# Patient Record
Sex: FEMALE | Race: WHITE | NOT HISPANIC OR LATINO | URBAN - METROPOLITAN AREA
[De-identification: names, ages, dates, MRNs, and addresses within clinical notes are randomized per-mention and may not be internally consistent; named-entity substitution may affect disease eponyms.]

---

## 2022-12-27 ENCOUNTER — OFFICE VISIT (OUTPATIENT)
Dept: URGENT CARE | Facility: CLINIC | Age: 32
End: 2022-12-27

## 2022-12-27 VITALS — TEMPERATURE: 97.9 F | RESPIRATION RATE: 20 BRPM | HEART RATE: 114 BPM | OXYGEN SATURATION: 97 %

## 2022-12-27 DIAGNOSIS — J02.0 STREP PHARYNGITIS: Primary | ICD-10-CM

## 2022-12-27 LAB — S PYO AG THROAT QL: NEGATIVE

## 2022-12-27 RX ORDER — AMOXICILLIN 500 MG/1
500 CAPSULE ORAL EVERY 12 HOURS SCHEDULED
Qty: 20 CAPSULE | Refills: 0 | Status: SHIPPED | OUTPATIENT
Start: 2022-12-27 | End: 2023-01-06

## 2022-12-27 RX ORDER — AMOXICILLIN 500 MG/1
500 CAPSULE ORAL EVERY 12 HOURS SCHEDULED
Qty: 20 CAPSULE | Refills: 0 | Status: SHIPPED | OUTPATIENT
Start: 2022-12-27 | End: 2022-12-27 | Stop reason: CLARIF

## 2022-12-28 NOTE — PATIENT INSTRUCTIONS
Take antibiotics as prescribed  Make sure to take all the antibiotic even after you start feeling better  If you stop them too soon, you risk developing a resistant infection that is more difficult and expensive to treat  Never save antibiotics or take antibiotics without the recommendation of a healthcare provider or dental professional  Note that if you are taking birth control medications, antibiotics can decrease their effectiveness  Recommend abstinence or back up method while on antibiotics and for 3-5 days after completing the antibiotic course  You are considered contagious until you have been on the antibiotic for 24 hours  You should not share food or drinks with others and should not kiss on the mouth in that time  You should also stay home from work or school to avoid spreading the infection to others  You need to switch to a brand new, never used toothbrush after 48 hours (or 2 days on the antibiotic) to prevent giving strep back to yourself again  If symptoms are not improved after 2-3 days on the antibiotics, follow-up with your primary care provider  If symptoms worsen or new symptoms such as drooling, difficulty swallowing, voice changes, anterior neck pain, or jaw pain develop report to the emergency room as these are symptoms of an abscess having formed in your throat or neck

## 2022-12-28 NOTE — PROGRESS NOTES
[facility]        NAME: Firman Cheadle is a 29 y o  female  : 1990    MRN: [id number]  DATE: 2022  TIME: 7:54 PM    Assessment and Plan   Strep pharyngitis [J02 0]  1  Strep pharyngitis  POCT rapid strepA    amoxicillin (AMOXIL) 500 mg capsule      Patient presents with symptoms concerning for possible strep  Rapid strep in clinic is negative; however, patient meets criteria for empiric treatment with exudative pharyngitis, lymphadenopathy, fever, absence of cough  She will be started on amoxicillin to treat as a treatment of choice  Patient instructed to follow-up with her primary care doctor in 2 to 3 days symptoms do not improve  We discussed symptoms of peritonsillar, tonsillar, and retropharyngeal abscesses and when to report to the emergency room  Patient Instructions     Patient Instructions   • Take antibiotics as prescribed  Make sure to take all the antibiotic even after you start feeling better  If you stop them too soon, you risk developing a resistant infection that is more difficult and expensive to treat  Never save antibiotics or take antibiotics without the recommendation of a healthcare provider or dental professional  Note that if you are taking birth control medications, antibiotics can decrease their effectiveness  Recommend abstinence or back up method while on antibiotics and for 3-5 days after completing the antibiotic course  • You are considered contagious until you have been on the antibiotic for 24 hours  You should not share food or drinks with others and should not kiss on the mouth in that time  You should also stay home from work or school to avoid spreading the infection to others  • You need to switch to a brand new, never used toothbrush after 48 hours (or 2 days on the antibiotic) to prevent giving strep back to yourself again  • If symptoms are not improved after 2-3 days on the antibiotics, follow-up with your primary care provider  • If symptoms worsen or new symptoms such as drooling, difficulty swallowing, voice changes, anterior neck pain, or jaw pain develop report to the emergency room as these are symptoms of an abscess having formed in your throat or neck  Follow up with PCP in 3-5 days  Proceed to  ER if symptoms worsen  Chief Complaint     Chief Complaint   Patient presents with   • Sore Throat     Started 4 days ago  Chills, fever nausea, body aches  History of Present Illness       29year old female presents with complaint of sore throat x 4 days  Pt notes fever of 100 7 and reports that she has been nauseated today  Pt reports having strep in the past and reports symptoms are similar to when she has had it before  Sore Throat   This is a new problem  The current episode started in the past 7 days  The problem has been gradually worsening  The pain is worse on the left side  The maximum temperature recorded prior to her arrival was 100 4 - 100 9 F  The fever has been present for 1 to 2 days  The pain is at a severity of 8/10  The pain is moderate  Associated symptoms include swollen glands  Pertinent negatives include no congestion, coughing, diarrhea, headaches, shortness of breath, trouble swallowing (painful to swallow) or vomiting  She has had no exposure to strep or mono  She has tried cool liquids, gargles and acetaminophen for the symptoms  The treatment provided no relief  Review of Systems   Review of Systems   Constitutional: Negative for chills, fatigue and fever  HENT: Positive for sore throat  Negative for congestion, postnasal drip, rhinorrhea, trouble swallowing (painful to swallow) and voice change  Respiratory: Negative for cough and shortness of breath  Cardiovascular: Negative for chest pain  Gastrointestinal: Negative for diarrhea, nausea and vomiting  Musculoskeletal: Negative for myalgias  Neurological: Negative for headaches           Current Medications Current Outpatient Medications:   •  amoxicillin (AMOXIL) 500 mg capsule, Take 1 capsule (500 mg total) by mouth every 12 (twelve) hours for 10 days, Disp: 20 capsule, Rfl: 0    Current Allergies     Allergies as of 12/27/2022   • (No Known Allergies)            The following portions of the patient's history were reviewed and updated as appropriate: allergies, current medications, past family history, past medical history, past social history, past surgical history and problem list      No past medical history on file  No past surgical history on file  No family history on file  Medications have been verified  Objective   Pulse (!) 114   Temp 97 9 °F (36 6 °C) (Temporal)   Resp 20   SpO2 97%   No LMP recorded  Physical Exam     Physical Exam  Vitals and nursing note reviewed  Constitutional:       General: She is not in acute distress  Appearance: Normal appearance  She is not ill-appearing or toxic-appearing  HENT:      Head: Normocephalic and atraumatic  Jaw: No trismus  Right Ear: Hearing, tympanic membrane, ear canal and external ear normal  There is no impacted cerumen  No foreign body  Left Ear: Hearing, tympanic membrane, ear canal and external ear normal  There is no impacted cerumen  No foreign body  Nose: No nasal deformity, mucosal edema, congestion or rhinorrhea  Right Nostril: No foreign body, epistaxis or occlusion  Left Nostril: No foreign body, epistaxis or occlusion  Right Turbinates: Not enlarged, swollen or pale  Left Turbinates: Not enlarged, swollen or pale  Mouth/Throat:      Lips: Pink  No lesions  Mouth: Mucous membranes are moist  No injury, oral lesions or angioedema  Dentition: Normal dentition  Tongue: No lesions  Tongue does not deviate from midline  Palate: No mass and lesions  Pharynx: Uvula midline  Pharyngeal swelling and posterior oropharyngeal erythema present   No oropharyngeal exudate or uvula swelling  Tonsils: Tonsillar exudate present  No tonsillar abscesses  2+ on the right  2+ on the left  Eyes:      General: Lids are normal  Gaze aligned appropriately  No allergic shiner  Extraocular Movements: Extraocular movements intact  Cardiovascular:      Rate and Rhythm: Normal rate  Pulmonary:      Effort: Pulmonary effort is normal       Comments: Patient speaking in full sentences with no increased respiratory effort  No audible wheezing or stridor  Lymphadenopathy:      Cervical: Cervical adenopathy present  Right cervical: Superficial cervical adenopathy present  No deep or posterior cervical adenopathy  Left cervical: Superficial cervical adenopathy present  No deep or posterior cervical adenopathy  Skin:     General: Skin is warm and dry  Neurological:      Mental Status: She is alert and oriented to person, place, and time  Coordination: Coordination is intact  Gait: Gait is intact  Psychiatric:         Attention and Perception: Attention and perception normal          Mood and Affect: Mood and affect normal          Speech: Speech normal          Behavior: Behavior is cooperative  Note: Portions of this record may have been created with voice recognition software  Occasional wrong word or "sound a like" substitutions may have occurred due to the inherent limitations of voice recognition software  Please read the chart carefully and recognize, using context, where substitutions have occurred  *

## 2023-05-02 ENCOUNTER — OFFICE VISIT (OUTPATIENT)
Dept: FAMILY MEDICINE CLINIC | Facility: CLINIC | Age: 33
End: 2023-05-02

## 2023-05-02 VITALS
WEIGHT: 242 LBS | OXYGEN SATURATION: 98 % | HEART RATE: 92 BPM | BODY MASS INDEX: 40.32 KG/M2 | DIASTOLIC BLOOD PRESSURE: 82 MMHG | TEMPERATURE: 97.1 F | HEIGHT: 65 IN | SYSTOLIC BLOOD PRESSURE: 112 MMHG | RESPIRATION RATE: 16 BRPM

## 2023-05-02 DIAGNOSIS — Z11.4 SCREENING FOR HIV (HUMAN IMMUNODEFICIENCY VIRUS): ICD-10-CM

## 2023-05-02 DIAGNOSIS — M85.00 FIBROUS DYSPLASIA OF BONE: ICD-10-CM

## 2023-05-02 DIAGNOSIS — R63.5 WEIGHT GAIN: ICD-10-CM

## 2023-05-02 DIAGNOSIS — Z13.1 SCREENING FOR DIABETES MELLITUS (DM): ICD-10-CM

## 2023-05-02 DIAGNOSIS — Z11.59 NEED FOR HEPATITIS C SCREENING TEST: ICD-10-CM

## 2023-05-02 DIAGNOSIS — E66.01 MORBID OBESITY WITH BMI OF 40.0-44.9, ADULT (HCC): Primary | ICD-10-CM

## 2023-05-02 DIAGNOSIS — Z76.89 ENCOUNTER TO ESTABLISH CARE: ICD-10-CM

## 2023-05-02 DIAGNOSIS — Z13.220 SCREENING FOR LIPID DISORDERS: ICD-10-CM

## 2023-05-02 NOTE — PATIENT INSTRUCTIONS
Stress management  Routine labwork and screenings as ordered  Weight loss is recommended to improve overall health  Dietary changes- limit carbohydrates, decrease overall caloric intake, reduce portion sizes, healthier snack choices, limit saturated fats, increase intake of fruits and vegetables, limit junk food  exercise to 3-5 times per week  Consider adding strength exercises to exercise routine

## 2023-05-02 NOTE — PROGRESS NOTES
Name: Hallie Sharif      : 1990      MRN: [id number]  Encounter Provider: HILLARY Jacques  Encounter Date: 2023   Encounter department: [address] [place] [address]  Morbid obesity with BMI of 40 0-44 9, adult ([address] )  Weight loss is recommended to improve overall health  Dietary changes- limit carbohydrates, decrease overall caloric intake, reduce portion sizes, healthier snack choices, limit saturated fats, increase intake of fruits and vegetables, limit junk food  exercise to 3-5 times per week  Consider adding strength exercises to exercise routine    - CBC and differential  - Comprehensive metabolic panel  - Hemoglobin A1C  - Lipid panel  - TSH, 3rd generation    2  Encounter to establish care  Heart healthy, carbohydrate controlled diet- limit red meat, limit saturated fat, moderate salt intake, limit junk food, etc    Regular exercise  Stress management  Routine labwork and screenings as ordered  - CBC and differential  - Comprehensive metabolic panel  - Hemoglobin A1C  - Lipid panel  - Hepatitis C antibody  - HIV 1/2 AG/AB W [facility]    3  Screening for diabetes mellitus (DM)  - Comprehensive metabolic panel  - Hemoglobin A1C    4  Screening for lipid disorders  - Lipid panel    5  Screening for HIV (human immunodeficiency virus)  - HIV 1/2 AG/AB W [facility] only    6  Need for hepatitis C screening test  - Hepatitis C antibody    7  Fibrous dysplasia of bone  - Comprehensive metabolic panel  - Vitamin D 25 hydroxy    8  Weight gain  - TSH, 3rd generation      BMI Counseling: Body mass index is 40 27 kg/m²  The BMI is above normal  Nutrition recommendations include reducing portion sizes and decreasing overall calorie intake  Exercise recommendations include exercising 3-5 times per week  Depression Screening Follow-up Plan: Patient's depression screening was negative with a PHQ-2 score of 0    Clinically patient does "not have depression  No treatment is required  Subjective      Pt here to establish care  Accompanied by   PMH, meds, allergies, SH, FH reviewed  History: fibrous dysplasia diagnosed 2010 left upper and lower arm  Surgeries: none  FH: mother- DM , lupus, sarcoidosis  Father- heart , htn  Maternal GM- breast cancer  Believes pancreatic and brain cancer on maternal side  SH: , lives with   Does not work outside of home  Non smoker  No etoh  No recreational drugs  Current medications: none  Current issues include: none  Has been gaining weight over last year or so  Thought was due to birth control , but is no longer taking  Review of Systems   Constitutional: Positive for unexpected weight change (gaining over past year)  HENT: Negative for congestion, sinus pressure, sinus pain and sore throat  Eyes: Negative for visual disturbance  Respiratory: Negative for cough, chest tightness and shortness of breath  Cardiovascular: Negative for chest pain, palpitations and leg swelling  Gastrointestinal: Negative for abdominal pain, diarrhea, nausea and vomiting  Endocrine: Negative for polydipsia and polyuria  Genitourinary: Negative for dysuria  Musculoskeletal: Positive for back pain (chronic, old injury 4 years)  Skin: Negative for rash and wound  Allergic/Immunologic: Negative for environmental allergies  Neurological: Negative for dizziness and headaches  Hematological: Does not bruise/bleed easily  Psychiatric/Behavioral: Negative for dysphoric mood  The patient is nervous/anxious (at times, but not significant)  No current outpatient medications on file prior to visit  Objective     /82   Pulse 92   Temp (!) 97 1 °F (36 2 °C) (Temporal)   Resp 16   Ht 5' 5\" (1 651 m)   Wt 110 kg (242 lb)   LMP 04/03/2023 (Approximate)   SpO2 98%   BMI 40 27 kg/m²     Physical Exam  Vitals reviewed     Constitutional:       General: She is not in " acute distress  Appearance: She is obese  She is not ill-appearing  Neck:      Vascular: No carotid bruit  Cardiovascular:      Rate and Rhythm: Normal rate and regular rhythm  Pulmonary:      Effort: Pulmonary effort is normal  No respiratory distress  Breath sounds: Normal breath sounds  No wheezing or rales  Abdominal:      General: There is no distension  Palpations: Abdomen is soft  Musculoskeletal:      Cervical back: Normal range of motion  Right lower leg: No edema  Left lower leg: No edema  Skin:     General: Skin is warm and dry  Coloration: Skin is not jaundiced or pale  Neurological:      General: No focal deficit present  Mental Status: She is alert and oriented to person, place, and time  Cranial Nerves: No cranial nerve deficit  Sensory: No sensory deficit  Psychiatric:         Mood and Affect: Mood normal          Behavior: Behavior normal          Thought Content:  Thought content normal          Judgment: Judgment normal        Richard Osorio

## 2023-05-03 LAB
25(OH)D3+25(OH)D2 SERPL-MCNC: 26.7 NG/ML (ref 30–100)
ALBUMIN SERPL-MCNC: 4.6 G/DL (ref 3.9–5)
ALBUMIN/GLOB SERPL: 1.7 {RATIO} (ref 1.2–2.2)
ALP SERPL-CCNC: 108 IU/L (ref 44–121)
ALT SERPL-CCNC: 23 IU/L (ref 0–32)
AST SERPL-CCNC: 21 IU/L (ref 0–40)
BASOPHILS # BLD AUTO: 0 X10E3/UL (ref 0–0.2)
BASOPHILS NFR BLD AUTO: 1 %
BILIRUB SERPL-MCNC: 0.2 MG/DL (ref 0–1.2)
BUN SERPL-MCNC: 13 MG/DL (ref 6–20)
BUN/CREAT SERPL: 15 (ref 9–23)
CALCIUM SERPL-MCNC: 9.7 MG/DL (ref 8.7–10.2)
CHLORIDE SERPL-SCNC: 104 MMOL/L (ref 96–106)
CHOLEST SERPL-MCNC: 154 MG/DL (ref 100–199)
CHOLEST/HDLC SERPL: 3.7 RATIO (ref 0–4.4)
CO2 SERPL-SCNC: 20 MMOL/L (ref 20–29)
CREAT SERPL-MCNC: 0.87 MG/DL (ref 0.57–1)
EGFR: 92 ML/MIN/1.73
EOSINOPHIL # BLD AUTO: 0.1 X10E3/UL (ref 0–0.4)
EOSINOPHIL NFR BLD AUTO: 2 %
ERYTHROCYTE [DISTWIDTH] IN BLOOD BY AUTOMATED COUNT: 12.8 % (ref 11.7–15.4)
EST. AVERAGE GLUCOSE BLD GHB EST-MCNC: 117 MG/DL
GLOBULIN SER-MCNC: 2.7 G/DL (ref 1.5–4.5)
GLUCOSE SERPL-MCNC: 94 MG/DL (ref 70–99)
HBA1C MFR BLD: 5.7 % (ref 4.8–5.6)
HCT VFR BLD AUTO: 41.1 % (ref 34–46.6)
HCV AB S/CO SERPL IA: NON REACTIVE
HDLC SERPL-MCNC: 42 MG/DL
HGB BLD-MCNC: 14 G/DL (ref 11.1–15.9)
HIV 1+2 AB+HIV1 P24 AG SERPL QL IA: NON REACTIVE
IMM GRANULOCYTES # BLD: 0 X10E3/UL (ref 0–0.1)
IMM GRANULOCYTES NFR BLD: 0 %
LDLC SERPL CALC-MCNC: 83 MG/DL (ref 0–99)
LYMPHOCYTES # BLD AUTO: 1.6 X10E3/UL (ref 0.7–3.1)
LYMPHOCYTES NFR BLD AUTO: 24 %
MCH RBC QN AUTO: 29.5 PG (ref 26.6–33)
MCHC RBC AUTO-ENTMCNC: 34.1 G/DL (ref 31.5–35.7)
MCV RBC AUTO: 87 FL (ref 79–97)
MONOCYTES # BLD AUTO: 0.6 X10E3/UL (ref 0.1–0.9)
MONOCYTES NFR BLD AUTO: 9 %
NEUTROPHILS # BLD AUTO: 4.4 X10E3/UL (ref 1.4–7)
NEUTROPHILS NFR BLD AUTO: 64 %
PLATELET # BLD AUTO: 298 X10E3/UL (ref 150–450)
POTASSIUM SERPL-SCNC: 4.2 MMOL/L (ref 3.5–5.2)
PROT SERPL-MCNC: 7.3 G/DL (ref 6–8.5)
RBC # BLD AUTO: 4.74 X10E6/UL (ref 3.77–5.28)
SL AMB VLDL CHOLESTEROL CALC: 29 MG/DL (ref 5–40)
SODIUM SERPL-SCNC: 140 MMOL/L (ref 134–144)
TRIGL SERPL-MCNC: 168 MG/DL (ref 0–149)
TSH SERPL DL<=0.005 MIU/L-ACNC: 2.52 UIU/ML (ref 0.45–4.5)
WBC # BLD AUTO: 6.7 X10E3/UL (ref 3.4–10.8)

## 2023-05-08 ENCOUNTER — OFFICE VISIT (OUTPATIENT)
Dept: FAMILY MEDICINE CLINIC | Facility: CLINIC | Age: 33
End: 2023-05-08

## 2023-05-08 VITALS
RESPIRATION RATE: 16 BRPM | BODY MASS INDEX: 41.15 KG/M2 | HEIGHT: 65 IN | SYSTOLIC BLOOD PRESSURE: 116 MMHG | WEIGHT: 247 LBS | OXYGEN SATURATION: 98 % | HEART RATE: 74 BPM | DIASTOLIC BLOOD PRESSURE: 80 MMHG | TEMPERATURE: 97.1 F

## 2023-05-08 DIAGNOSIS — Z00.00 ANNUAL PHYSICAL EXAM: Primary | ICD-10-CM

## 2023-05-08 DIAGNOSIS — Z91.018 MULTIPLE FOOD ALLERGIES: ICD-10-CM

## 2023-05-08 PROBLEM — R73.03 PREDIABETES: Status: ACTIVE | Noted: 2023-05-08

## 2023-05-08 PROBLEM — E55.9 VITAMIN D DEFICIENCY: Status: ACTIVE | Noted: 2023-05-08

## 2023-05-08 NOTE — PATIENT INSTRUCTIONS
Heart healthy, carbohydrate controlled diet- limit red meat, limit saturated fat, moderate salt intake, limit junk food, etc    Regular exercise  Stress management  Routine labwork and screenings as ordered     Over the counter vitamin D3 1000 units daily

## 2023-05-08 NOTE — PROGRESS NOTES
FAMILY PRACTICE HEALTH MAINTENANCE OFFICE VISIT  [facility]    NAME: Sarah Melvin  AGE: 29 y o  SEX: female  : 1990     DATE: 2023    Assessment and Plan   1  Annual physical exam  Heart healthy, carbohydrate controlled diet- limit red meat, limit saturated fat, moderate salt intake, limit junk food, etc    Regular exercise  Stress management  Routine labwork and screenings as ordered  otc vitamin D3, 1000 units daily    2  Multiple food allergies  - Ambulatory Referral to Allergy; Future      · Patient Counseling:   · Nutrition: Stressed importance of a well balanced diet, moderation of sodium/saturated fat, caloric balance and sufficient intake of fiber  · Exercise: Stressed the importance of regular exercise with a goal of 150 minutes per week  · Dental Health: Discussed daily flossing and brushing and regular dental visits   · Alcohol Use:  Recommended moderation of alcohol intake  · Injury Prevention: Discussed Safety Belts, Safety Helmets, and Smoke Detectors    · Immunizations reviewed: Risks and Benefits discussed  · Discussed benefits of:  Cervical Cancer screening and Screening labs  • BMI Counseling: Body mass index is 41 1 kg/m²  Discussed with patient's BMI with her  The BMI is above normal  Nutrition recommendations include reducing portion sizes, decreasing overall calorie intake, moderation in carbohydrate intake, reducing intake of saturated fat and trans fat and reducing intake of cholesterol  Exercise recommendations include exercising 3-5 times per week            Chief Complaint     Chief Complaint   Patient presents with   • Physical Exam     Lab review       History of Present Illness     Here for annual exam and lab review  Feels well  No recent illness  No specific concerns or issues except would like to see allergist  Ephraim Bell that she has undiagnosed food allergies and would like to have testing and eval  Certain foods cause GI upset  Thinks has egg allergy but never tested      Well Adult Physical   Patient here for a comprehensive physical exam       Diet and Physical Activity  Diet: well balanced diet  Exercise: rarely      Depression Screen  PHQ-2/9 Depression Screening    Little interest or pleasure in doing things: 0 - not at all  Feeling down, depressed, or hopeless: 0 - not at all  PHQ-2 Score: 0  PHQ-2 Interpretation: Negative depression screen     Depression Screening Follow-up Plan: Patient's depression screening was negative with a PHQ-2 score of 0    Clinically patient does not have depression  No treatment is required  General Health  Hearing: Normal:  bilateral  Vision: goes for regular eye exams  Dental: regular dental visits    Reproductive Health  Follows with gynecologist      The following portions of the patient's history were reviewed and updated as appropriate: allergies, current medications, past family history, past medical history, past social history, past surgical history and problem list     Review of Systems     Review of Systems   Constitutional: Negative for chills, diaphoresis, fatigue and fever  HENT: Negative for congestion, ear pain, sinus pressure, sinus pain and sore throat  Eyes: Negative for visual disturbance  Respiratory: Negative for cough, chest tightness, shortness of breath and wheezing  Cardiovascular: Negative for chest pain, palpitations and leg swelling  Gastrointestinal: Negative for abdominal distention, abdominal pain, diarrhea and nausea  Endocrine: Negative for polydipsia  Genitourinary: Negative for dysuria, frequency and urgency  Musculoskeletal: Negative for arthralgias, back pain and myalgias  Allergic/Immunologic: Negative for immunocompromised state  Neurological: Negative for dizziness, weakness and headaches  Hematological: Negative for adenopathy  Psychiatric/Behavioral: Negative for dysphoric mood  The patient is not nervous/anxious  All other systems reviewed and are negative  Past Medical History     History reviewed  No pertinent past medical history  Past Surgical History     History reviewed  No pertinent surgical history  Social History     Social History     Socioeconomic History   • Marital status: Unknown     Spouse name: None   • Number of children: None   • Years of education: None   • Highest education level: None   Occupational History   • None   Tobacco Use   • Smoking status: Never     Passive exposure: Never   • Smokeless tobacco: Never   Vaping Use   • Vaping Use: Never used   Substance and Sexual Activity   • Alcohol use: Never   • Drug use: Never   • Sexual activity: None   Other Topics Concern   • None   Social History Narrative   • None     Social Determinants of Health     Financial Resource Strain: Not on file   Food Insecurity: Not on file   Transportation Needs: Not on file   Physical Activity: Not on file   Stress: Not on file   Social Connections: Not on file   Intimate Partner Violence: Not on file   Housing Stability: Not on file       Family History     Family History   Problem Relation Age of Onset   • Lupus Mother    • Diabetes Mother    • Hypertension Father        Current Medications     No current outpatient medications on file       Allergies     Allergies   Allergen Reactions   • Nickel Itching       Objective     Recent Results (from the past 672 hour(s))   CBC and differential    Collection Time: 05/02/23  1:41 PM   Result Value Ref Range    White Blood Cell Count 6 7 3 4 - 10 8 x10E3/uL    Red Blood Cell Count 4 74 3 77 - 5 28 x10E6/uL    Hemoglobin 14 0 11 1 - 15 9 g/dL    HCT 41 1 34 0 - 46 6 %    MCV 87 79 - 97 fL    MCH 29 5 26 6 - 33 0 pg    MCHC 34 1 31 5 - 35 7 g/dL    RDW 12 8 11 7 - 15 4 %    Platelet Count 177 405 - 450 x10E3/uL    Neutrophils 64 Not Estab  %    Lymphocytes 24 Not Estab  %    Monocytes 9 Not Estab  %    Eosinophils 2 Not Estab  %    Basophils PCT 1 Not Estab  % Neutrophils (Absolute) 4 4 1 4 - 7 0 x10E3/uL    Lymphocytes (Absolute) 1 6 0 7 - 3 1 x10E3/uL    Monocytes (Absolute) 0 6 0 1 - 0 9 x10E3/uL    Eosinophils (Absolute) 0 1 0 0 - 0 4 x10E3/uL    Basophils ABS 0 0 0 0 - 0 2 x10E3/uL    Immature Granulocytes 0 Not Estab  %    Immature Granulocytes (Absolute) 0 0 0 0 - 0 1 x10E3/uL   Comprehensive metabolic panel    Collection Time: 05/02/23  1:41 PM   Result Value Ref Range    Glucose, Random 94 70 - 99 mg/dL    BUN 13 6 - 20 mg/dL    Creatinine 0 87 0 57 - 1 00 mg/dL    eGFR 92 >59 mL/min/1 73    SL AMB BUN/CREATININE RATIO 15 9 - 23    Sodium 140 134 - 144 mmol/L    Potassium 4 2 3 5 - 5 2 mmol/L    Chloride 104 96 - 106 mmol/L    CO2 20 20 - 29 mmol/L    CALCIUM 9 7 8 7 - 10 2 mg/dL    Protein, Total 7 3 6 0 - 8 5 g/dL    Albumin 4 6 3 9 - 5 0 g/dL    Globulin, Total 2 7 1 5 - 4 5 g/dL    Albumin/Globulin Ratio 1 7 1 2 - 2 2    TOTAL BILIRUBIN 0 2 0 0 - 1 2 mg/dL    Alk Phos Isoenzymes 108 44 - 121 IU/L    AST 21 0 - 40 IU/L    ALT 23 0 - 32 IU/L   Hemoglobin A1C    Collection Time: 05/02/23  1:41 PM   Result Value Ref Range    Hemoglobin A1C 5 7 (H) 4 8 - 5 6 %    Estimated Average Glucose 117 mg/dL   Lipid panel    Collection Time: 05/02/23  1:41 PM   Result Value Ref Range    Cholesterol, Total 154 100 - 199 mg/dL    Triglycerides 168 (H) 0 - 149 mg/dL    HDL 42 >39 mg/dL    VLDL Cholesterol Calculated 29 5 - 40 mg/dL    LDL Calculated 83 0 - 99 mg/dL    T   Chol/HDL Ratio 3 7 0 0 - 4 4 ratio   TSH, 3rd generation    Collection Time: 05/02/23  1:41 PM   Result Value Ref Range    TSH 2 520 0 450 - 4 500 uIU/mL   Vitamin D 25 hydroxy    Collection Time: 05/02/23  1:41 PM   Result Value Ref Range    25-HYDROXY VIT D 26 7 (L) 30 0 - 100 0 ng/mL   Hepatitis C antibody    Collection Time: 05/02/23  1:41 PM   Result Value Ref Range    HEP C AB Non Reactive Non Reactive   HIV 1/2 AG/AB W [facility]LEX [facility] only    Collection Time: 05/02/23  1:41 PM   Result Value "Ref Range    HIV Screen 4th Generation wRflx Non Reactive Non Reactive       /80   Pulse 74   Temp (!) 97 1 °F (36 2 °C) (Temporal)   Resp 16   Ht 5' 5\" (1 651 m)   Wt 112 kg (247 lb)   SpO2 98%   BMI 41 10 kg/m²      Physical Exam  Constitutional:       General: She is not in acute distress  Appearance: She is well-developed  She is obese  She is not ill-appearing  HENT:      Head: Normocephalic and atraumatic  Right Ear: Tympanic membrane normal       Left Ear: Tympanic membrane and ear canal normal       Nose: Nose normal       Mouth/Throat:      Mouth: Mucous membranes are moist       Pharynx: Oropharynx is clear  Eyes:      General: No scleral icterus  Extraocular Movements: Extraocular movements intact  Pupils: Pupils are equal, round, and reactive to light  Neck:      Thyroid: No thyromegaly  Cardiovascular:      Rate and Rhythm: Normal rate and regular rhythm  Heart sounds: Normal heart sounds  No murmur heard  Pulmonary:      Effort: Pulmonary effort is normal  No respiratory distress  Breath sounds: Normal breath sounds  No wheezing or rales  Abdominal:      General: Bowel sounds are normal  There is no distension  Palpations: Abdomen is soft  Tenderness: There is no abdominal tenderness  Musculoskeletal:         General: Normal range of motion  Cervical back: Normal range of motion and neck supple  Right lower leg: No edema  Left lower leg: No edema  Lymphadenopathy:      Cervical: No cervical adenopathy  Skin:     General: Skin is warm and dry  Coloration: Skin is not jaundiced or pale  Neurological:      General: No focal deficit present  Mental Status: She is alert and oriented to person, place, and time  Cranial Nerves: No cranial nerve deficit  Sensory: No sensory deficit  Psychiatric:         Mood and Affect: Mood normal          Behavior: Behavior normal          Thought Content:  Thought " content normal          Judgment: Judgment normal            Vision Screening    Right eye Left eye Both eyes   Without correction 20/30 20/40 20/25   With correction      Comments: [id number] [place] [address]

## 2023-07-02 ENCOUNTER — OFFICE VISIT (OUTPATIENT)
Dept: URGENT CARE | Facility: CLINIC | Age: 33
End: 2023-07-02
Payer: COMMERCIAL

## 2023-07-02 VITALS
DIASTOLIC BLOOD PRESSURE: 82 MMHG | HEART RATE: 101 BPM | OXYGEN SATURATION: 98 % | RESPIRATION RATE: 20 BRPM | TEMPERATURE: 98.2 F | SYSTOLIC BLOOD PRESSURE: 129 MMHG

## 2023-07-02 DIAGNOSIS — L23.9 ALLERGIC CONTACT DERMATITIS, UNSPECIFIED TRIGGER: Primary | ICD-10-CM

## 2023-07-02 PROCEDURE — 99213 OFFICE O/P EST LOW 20 MIN: CPT | Performed by: NURSE PRACTITIONER

## 2023-07-02 RX ORDER — FLUOCINONIDE 0.5 MG/G
OINTMENT TOPICAL 2 TIMES DAILY
Qty: 60 G | Refills: 0 | Status: SHIPPED | OUTPATIENT
Start: 2023-07-02

## 2023-07-02 RX ORDER — PREDNISONE 10 MG/1
TABLET ORAL
Qty: 36 TABLET | Refills: 0 | Status: SHIPPED | OUTPATIENT
Start: 2023-07-02

## 2023-07-02 NOTE — PATIENT INSTRUCTIONS
--Apply topical steroid sparingly to affected areas. Avoid to face. --Rx oral steroid (prednisone) as prescribed  --OTC calamine lotion as needed to areas of itching  --Avoid known triggers.    --Seek re-evaluation if no resolution/worsening over the next 1-2 weeks despite these areas

## 2023-07-02 NOTE — PROGRESS NOTES
[facility]        NAME: Blair Argueta is a 34 y.o. female  : 1990    MRN: [id number]  DATE: 2023  TIME: 12:45 PM    Assessment and Plan   Allergic contact dermatitis, unspecified trigger [L23.9]  1. Allergic contact dermatitis, unspecified trigger  predniSONE 10 mg tablet    fluocinonide (LIDEX) 0.05 % ointment            Patient Instructions     --Apply topical steroid sparingly to affected areas. Avoid to face. --Rx oral steroid (prednisone) as prescribed  --OTC calamine lotion as needed to areas of itching  --Avoid known triggers. --Seek re-evaluation if no resolution/worsening over the next 1-2 weeks despite these areas    Chief Complaint     Chief Complaint   Patient presents with   • Rash     Started 2 weeks ago. Small spots on various spots on body. Using anti itch and cream for ringworm. History of Present Illness       Here with  for complaints of very itchy rash x 2 weeks. Started on left ankle. Has since spread to other areas including arms, opposite legs, neck, portion of trunk. Minimally sore. Some areas of weeping. No relief from OTC Lotrimine. No obvious contact exposures. May have come in contact with something outdoors, not sure. Review of Systems   Review of Systems   Constitutional: Negative for fever. Skin: Positive for rash.          Current Medications       Current Outpatient Medications:   •  fluocinonide (LIDEX) 0.05 % ointment, Apply topically 2 (two) times a day, Disp: 60 g, Rfl: 0  •  predniSONE 10 mg tablet, TAKE 6 TAB PO DAILY X 1 DAY, THEN 5 TAB DAILY X 2 DAYS, THEN 4 TAB DAILY X 2 DAYS, THEN 3 TAB DAILY X 2 DAYS, THEN 2 TAB DAILY X 2 DAYS, THEN 1 TAB DAILY X 2 DAYS, Disp: 36 tablet, Rfl: 0    Current Allergies     Allergies as of 2023 - Reviewed 2023   Allergen Reaction Noted   • Nickel Itching 2023            The following portions of the patient's history were reviewed and updated as appropriate: allergies, current medications, past family history, past medical history, past social history, past surgical history and problem list.     No past medical history on file. No past surgical history on file. Family History   Problem Relation Age of Onset   • Lupus Mother    • Diabetes Mother    • Hypertension Father          Medications have been verified. Objective   /82   Pulse 101   Temp 98.2 °F (36.8 °C) (Temporal)   Resp 20   SpO2 98%   No LMP recorded. Physical Exam     Physical Exam  Constitutional:       General: She is not in acute distress. Skin:     Findings: Erythema and rash present. Comments: Left medial ankle with two 2-3 cm diameter, irregularly shaped, indistinctly bordered erythematous patches with overlying tiny vesicles. No scale or central clearing. Similar, but smaller lesions/patches of varying shapes and sizes, some linear, on arms, remainder of legs, side of neck. Some areas of weeping. Non-tender. Neurological:      Mental Status: She is alert.

## 2023-07-03 ENCOUNTER — OFFICE VISIT (OUTPATIENT)
Dept: OBGYN CLINIC | Facility: CLINIC | Age: 33
End: 2023-07-03
Payer: COMMERCIAL

## 2023-07-03 VITALS — DIASTOLIC BLOOD PRESSURE: 80 MMHG | SYSTOLIC BLOOD PRESSURE: 120 MMHG | WEIGHT: 250 LBS | BODY MASS INDEX: 41.6 KG/M2

## 2023-07-03 DIAGNOSIS — Z23 NEED FOR HPV VACCINE: ICD-10-CM

## 2023-07-03 DIAGNOSIS — Z01.419 WOMEN'S ANNUAL ROUTINE GYNECOLOGICAL EXAMINATION: Primary | ICD-10-CM

## 2023-07-03 PROCEDURE — 90651 9VHPV VACCINE 2/3 DOSE IM: CPT | Performed by: NURSE PRACTITIONER

## 2023-07-03 PROCEDURE — 90471 IMMUNIZATION ADMIN: CPT | Performed by: NURSE PRACTITIONER

## 2023-07-03 PROCEDURE — G0145 SCR C/V CYTO,THINLAYER,RESCR: HCPCS | Performed by: NURSE PRACTITIONER

## 2023-07-03 PROCEDURE — S0610 ANNUAL GYNECOLOGICAL EXAMINA: HCPCS | Performed by: NURSE PRACTITIONER

## 2023-07-03 PROCEDURE — G0476 HPV COMBO ASSAY CA SCREEN: HCPCS | Performed by: NURSE PRACTITIONER

## 2023-07-03 NOTE — PROGRESS NOTES
Subjective    HPI:     Shannon Hdz is a 34 y.o. nulliparous female. In a stable marital relationship. Her menstrual cycles are monthly. Her current method of contraception includes condoms. She denies issues with intimacy. She denies /GI and Gyn complaints. She feels safe at home. She suffers from situational and social anxiety. History of breast cancer in maternal grandmother dx in her 52's. Medical, surgical and family history reviewed. Her dental care is not done - last cleaning was about a year ago. Visit Vitals  /80   Wt 113 kg (250 lb)   LMP 2023   BMI 41.60 kg/m²   OB Status Having periods   Smoking Status Never   BSA 2.17 m²       Gynecologic History    Patient's last menstrual period was 2023. Gardasil Vaccine Series: did not receive. She interested in initiating series. Last Pap: about 5 years ago Results were: normal per patient    Obstetric and Medical History    OB History    Para Term  AB Living   0 0 0 0 0 0   SAB IAB Ectopic Multiple Live Births   0 0 0 0 0       Past Medical History:   Diagnosis Date   • Varicella     As a child       History reviewed. No pertinent surgical history. The following portions of the patient's history were reviewed and updated as appropriate: allergies, current medications, past family history, past medical history, past social history, past surgical history and problem list.    Review of Systems    Pertinent items are noted in HPI. Objective    Physical Exam  Constitutional:       Appearance: Normal appearance. She is well-developed. She is obese. Genitourinary:      Vulva, bladder and urethral meatus normal.      No lesions in the vagina. Right Labia: No rash, tenderness, lesions, skin changes or Bartholin's cyst.     Left Labia: No tenderness, lesions, skin changes, Bartholin's cyst or rash. No labial fusion noted. No inguinal adenopathy present in the right or left side.      No vaginal discharge, erythema, tenderness, bleeding or granulation tissue. No vaginal prolapse present. No vaginal atrophy present. Right Adnexa: not tender, not full and no mass present. Left Adnexa: not tender, not full and no mass present. Cervix is nulliparous. No cervical motion tenderness, discharge, friability, lesion, polyp or nabothian cyst.      Uterus is not enlarged, tender, irregular or prolapsed. No uterine mass detected. Uterus is anteverted. Pelvic exam was performed with patient in the lithotomy position. Breasts:     Breasts are symmetrical.      Right: No inverted nipple, mass, nipple discharge, skin change or tenderness. Left: No inverted nipple, mass, nipple discharge, skin change or tenderness. HENT:      Head: Normocephalic and atraumatic. Neck:      Thyroid: No thyromegaly. Cardiovascular:      Rate and Rhythm: Normal rate and regular rhythm. Heart sounds: Normal heart sounds, S1 normal and S2 normal.   Pulmonary:      Effort: Pulmonary effort is normal.      Breath sounds: Normal breath sounds. Abdominal:      General: Bowel sounds are normal. There is no distension. Palpations: Abdomen is soft. There is no mass. Tenderness: There is no abdominal tenderness. There is no guarding. Hernia: There is no hernia in the left inguinal area or right inguinal area. Musculoskeletal:      Cervical back: Neck supple. Lymphadenopathy:      Cervical: No cervical adenopathy. Upper Body:      Right upper body: No supraclavicular or axillary adenopathy. Left upper body: No supraclavicular or axillary adenopathy. Lower Body: No right inguinal adenopathy. No left inguinal adenopathy. Neurological:      Mental Status: She is alert. Skin:     General: Skin is warm and dry. Findings: No rash.    Psychiatric:         Attention and Perception: Attention and perception normal.         Mood and Affect: Mood and affect normal. Speech: Speech normal.         Behavior: Behavior is cooperative. Thought Content: Thought content normal.         Cognition and Memory: Cognition and memory normal.         Judgment: Judgment normal.   Vitals and nursing note reviewed. Assessment and Plan    Aida Leiva was seen today for gynecologic exam.    Diagnoses and all orders for this visit:    Women's annual routine gynecological examination  -     Liquid-based pap, screening    Need for HPV vaccine  -     HPV Vaccine 9 valent IM      Patient informed of a Stable GYN exam. A pap smear was performed. I have discussed the importance of exercise and healthy diet as well as adequate intake of calcium and vitamin D. The current ASCCP guidelines were reviewed. The low risk patient will receive pap smear screening every 3 years until the age of 34 and then every 3 to 5 years with HPV co-testing from the ages of 32-69. I emphasized the importance of an annual pelvic and breast exam. A yearly mammogram is recommended for breast cancer screening starting at age 36. Results will be released to [facility], if abnormal will call to review and discuss treatment plan. All questions have been answered to her satisfaction. Education reviewed: self breast exams and Gardasil vaccine series. Initiating today. Contraception: condoms. Follow up in: 2 months for 2nd Gardasil dose and 6 months for 3rd Gardasil dose, 1 year for annual exam or sooner for acute visit.

## 2023-07-06 LAB
HPV HR 12 DNA CVX QL NAA+PROBE: NEGATIVE
HPV16 DNA CVX QL NAA+PROBE: NEGATIVE
HPV18 DNA CVX QL NAA+PROBE: NEGATIVE

## 2023-07-12 LAB
LAB AP GYN PRIMARY INTERPRETATION: NORMAL
Lab: NORMAL

## 2023-10-30 DIAGNOSIS — Z12.31 ENCOUNTER FOR SCREENING MAMMOGRAM FOR MALIGNANT NEOPLASM OF BREAST: Primary | ICD-10-CM

## 2023-10-30 DIAGNOSIS — Z80.3 FAMILY HISTORY OF BREAST CANCER IN MOTHER: ICD-10-CM

## 2024-02-20 ENCOUNTER — OFFICE VISIT (OUTPATIENT)
Dept: URGENT CARE | Facility: CLINIC | Age: 34
End: 2024-02-20
Payer: COMMERCIAL

## 2024-02-20 VITALS
RESPIRATION RATE: 16 BRPM | BODY MASS INDEX: 42.1 KG/M2 | WEIGHT: 253 LBS | HEART RATE: 122 BPM | TEMPERATURE: 98 F | OXYGEN SATURATION: 98 %

## 2024-02-20 DIAGNOSIS — K04.7 DENTAL INFECTION: Primary | ICD-10-CM

## 2024-02-20 PROCEDURE — 99203 OFFICE O/P NEW LOW 30 MIN: CPT | Performed by: PHYSICIAN ASSISTANT

## 2024-02-20 RX ORDER — AMOXICILLIN 500 MG/1
CAPSULE ORAL
COMMUNITY
Start: 2024-02-07

## 2024-02-20 RX ORDER — CLINDAMYCIN HYDROCHLORIDE 300 MG/1
300 CAPSULE ORAL 4 TIMES DAILY
Qty: 28 CAPSULE | Refills: 0 | Status: SHIPPED | OUTPATIENT
Start: 2024-02-20 | End: 2024-02-27

## 2024-02-20 NOTE — PROGRESS NOTES
[facility]        NAME: Kalpana Reeves is a 33 y.o. female  : 1990    MRN: [id number]  DATE: 2024  TIME: 5:50 PM    Assessment and Plan   Dental infection [K04.7]  1. Dental infection  clindamycin (CLEOCIN) 300 MG capsule        No palpable abscess.  Pt on amoxicillin but doesn't appear to be working.  Will start on clindamycin.  Educated pt that if sx worsen, she develops fevers, or if sx do not improve in 24 hours she should go to ED immediately.  At this point no clear sign of abscess, no systemic sx, will try outpatient therapy.  Pt states she understands and agrees to this plan.    Patient Instructions     Continue to monitor symptoms.  If new or worsening symptoms develop, go immediately to Er. Drink plenty of fluids.  Follow up with Family Doctor this week.    Chief Complaint     Chief Complaint   Patient presents with    Dental Problem     Pt presents with left side lower jaw swelling, pain; started yesterday         History of Present Illness       Dental Pain   This is a recurrent problem. Episode onset: Pt has recurrent dental infections of L lower jaw. Pt currently on amox that she started taking last night due to swelling and dental pain.  Throughout the day to day swelling has significantly worsened. The problem occurs constantly. The problem has been gradually worsening. The pain is severe. Pertinent negatives include no difficulty swallowing, fever, oral bleeding, sinus pressure or thermal sensitivity. Treatments tried: amox. The treatment provided no relief.       Review of Systems   Review of Systems   Constitutional:  Negative for chills, diaphoresis, fatigue and fever.   HENT:  Positive for dental problem and facial swelling. Negative for congestion, ear pain, rhinorrhea, sinus pressure and voice change.    Eyes: Negative.    Respiratory:  Negative for cough, chest tightness and shortness of breath.    Cardiovascular:  Negative for chest pain and palpitations.    Gastrointestinal:  Negative for abdominal pain, constipation, diarrhea, nausea and vomiting.   Endocrine: Negative.    Genitourinary:  Negative for dysuria.   Musculoskeletal:  Negative for back pain, myalgias and neck pain.   Skin:  Negative for pallor and rash.   Allergic/Immunologic: Negative.    Neurological:  Negative for dizziness, syncope and headaches.   Hematological: Negative.    Psychiatric/Behavioral: Negative.           Current Medications       Current Outpatient Medications:     amoxicillin (AMOXIL) 500 mg capsule, , Disp: , Rfl:     clindamycin (CLEOCIN) 300 MG capsule, Take 1 capsule (300 mg total) by mouth 4 (four) times a day for 7 days, Disp: 28 capsule, Rfl: 0    Current Allergies     Allergies as of 02/20/2024 - Reviewed 02/20/2024   Allergen Reaction Noted    Nickel Itching 05/02/2023            The following portions of the patient's history were reviewed and updated as appropriate: allergies, current medications, past family history, past medical history, past social history, past surgical history and problem list.     Past Medical History:   Diagnosis Date    Varicella     As a child       History reviewed. No pertinent surgical history.    Family History   Problem Relation Age of Onset    Lupus Mother     Diabetes Mother         Possobly brought on by steroids.    Sarcoidosis Mother     Hypertension Father     Breast cancer Maternal Grandmother 50        Twice, as well as both her sisters.    Cancer Maternal Grandmother         Several of her family with varying forms.         Medications have been verified.        Objective   Pulse (!) 122   Temp 98 °F (36.7 °C)   Resp 16   Wt 115 kg (253 lb)   LMP 02/09/2024 (Exact Date)   SpO2 98%   BMI 42.10 kg/m²        Physical Exam     Physical Exam  Vitals and nursing note reviewed.   Constitutional:       General: She is not in acute distress.     Appearance: Normal appearance. She is well-developed. She is not ill-appearing or  diaphoretic.   HENT:      Head: Normocephalic and atraumatic.        Right Ear: External ear normal.      Left Ear: External ear normal.      Mouth/Throat:     Eyes:      General:         Right eye: No discharge.         Left eye: No discharge.      Conjunctiva/sclera: Conjunctivae normal.   Cardiovascular:      Rate and Rhythm: Normal rate and regular rhythm.      Heart sounds: Normal heart sounds.   Pulmonary:      Effort: Pulmonary effort is normal. No respiratory distress.      Breath sounds: Normal breath sounds. No wheezing, rhonchi or rales.   Musculoskeletal:      Cervical back: Normal range of motion and neck supple.   Lymphadenopathy:      Cervical: No cervical adenopathy.   Skin:     General: Skin is warm.      Capillary Refill: Capillary refill takes less than 2 seconds.      Findings: No rash.   Neurological:      Mental Status: She is alert.

## 2024-02-20 NOTE — PATIENT INSTRUCTIONS
Continue to monitor symptoms.  If new or worsening symptoms develop, go immediately to Er. Drink plenty of fluids.  Follow up with Family Doctor this week.    Toothache   AMBULATORY CARE:   A toothache  is pain that is caused by irritation of the nerves in the center of your tooth. The irritation may be caused by several problems, such as a cavity, an infection, a cracked tooth, or gum disease.        Return to the emergency department if:   You have trouble breathing.     You have swelling in your face or neck.     You have a fever and chills.     You have trouble speaking or swallowing.     You have trouble opening or closing your mouth.    Contact your dentist if:   You have questions or concerns about your condition or care.      Treatment  depends on the cause of your toothache. You may need any of the following:  NSAIDs , such as ibuprofen, help decrease swelling, pain, and fever. This medicine is available with or without a doctor's order. NSAIDs can cause stomach bleeding or kidney problems in certain people. If you take blood thinner medicine, always ask if NSAIDs are safe for you. Always read the medicine label and follow directions. Do not give these medicines to children younger than 6 months without direction from a healthcare provider.     Acetaminophen  decreases pain and fever. It is available without a doctor's order. Ask how much to take and how often to take it. Follow directions. Acetaminophen can cause liver damage if not taken correctly.    Prescription pain medicine  may be given. Ask your healthcare provider how to take this medicine safely. Some prescription pain medicines contain acetaminophen. Do not take other medicines that contain acetaminophen without talking to your healthcare provider. Too much acetaminophen may cause liver damage. Prescription pain medicine may cause constipation. Ask your healthcare provider how to prevent or treat constipation.     Antibiotics  help treat or  prevent a bacterial infection.    Manage your toothache:   Rinse your mouth with warm salt water  4 times a day or as directed.     Eat soft foods  to help relieve pain caused by chewing.     Apply ice on your jaw or cheek  for 15 to 20 minutes every hour or as directed. Use an ice pack, or put crushed ice in a plastic bag. Cover it with a towel before you apply it. Ice helps prevent tissue damage and decreases swelling and pain.    Help prevent a toothache:   Brush your teeth at least 2 times a day.    Use dental floss to clean between your teeth at least 1 time a day.    See your dentist regularly every 6 months for dental cleanings and oral exams.    Follow up with your dentist as directed:  You may be referred to a dental surgeon. Write down your questions so you remember to ask them during your visits.   © Copyright [employer] 2023 Information is for End User's use only and may not be sold, redistributed or otherwise used for commercial purposes.  The above information is an  only. It is not intended as medical advice for individual conditions or treatments. Talk to your doctor, nurse or pharmacist before following any medical regimen to see if it is safe and effective for you.

## 2024-02-21 ENCOUNTER — HOSPITAL ENCOUNTER (EMERGENCY)
Facility: HOSPITAL | Age: 34
Discharge: HOME | End: 2024-02-21
Attending: EMERGENCY MEDICINE
Payer: COMMERCIAL

## 2024-02-21 ENCOUNTER — APPOINTMENT (EMERGENCY)
Dept: RADIOLOGY | Facility: HOSPITAL | Age: 34
End: 2024-02-21
Payer: COMMERCIAL

## 2024-02-21 VITALS
HEART RATE: 96 BPM | SYSTOLIC BLOOD PRESSURE: 146 MMHG | RESPIRATION RATE: 18 BRPM | HEIGHT: 66 IN | TEMPERATURE: 98.8 F | WEIGHT: 252 LBS | BODY MASS INDEX: 40.5 KG/M2 | OXYGEN SATURATION: 97 % | DIASTOLIC BLOOD PRESSURE: 84 MMHG

## 2024-02-21 DIAGNOSIS — K04.7 DENTAL INFECTION: Primary | ICD-10-CM

## 2024-02-21 LAB
ALBUMIN SERPL BCP-MCNC: 4.2 G/DL (ref 3.5–5)
ALP SERPL-CCNC: 76 U/L (ref 34–104)
ALT SERPL W P-5'-P-CCNC: 15 U/L (ref 7–52)
ANION GAP SERPL CALCULATED.3IONS-SCNC: 9 MMOL/L
AST SERPL W P-5'-P-CCNC: 12 U/L (ref 13–39)
BASOPHILS # BLD AUTO: 0.05 THOUSANDS/ÂΜL (ref 0–0.1)
BASOPHILS NFR BLD AUTO: 1 % (ref 0–1)
BILIRUB SERPL-MCNC: 0.54 MG/DL (ref 0.2–1)
BUN SERPL-MCNC: 10 MG/DL (ref 5–25)
CALCIUM SERPL-MCNC: 9.1 MG/DL (ref 8.4–10.2)
CHLORIDE SERPL-SCNC: 102 MMOL/L (ref 96–108)
CO2 SERPL-SCNC: 24 MMOL/L (ref 21–32)
CREAT SERPL-MCNC: 0.75 MG/DL (ref 0.6–1.3)
EOSINOPHIL # BLD AUTO: 0.08 THOUSAND/ÂΜL (ref 0–0.61)
EOSINOPHIL NFR BLD AUTO: 1 % (ref 0–6)
ERYTHROCYTE [DISTWIDTH] IN BLOOD BY AUTOMATED COUNT: 13 % (ref 11.6–15.1)
GFR SERPL CREATININE-BSD FRML MDRD: 106 ML/MIN/1.73SQ M
GLUCOSE SERPL-MCNC: 90 MG/DL (ref 65–140)
HCT VFR BLD AUTO: 39.8 % (ref 34.8–46.1)
HGB BLD-MCNC: 13.6 G/DL (ref 11.5–15.4)
IMM GRANULOCYTES # BLD AUTO: 0.02 THOUSAND/UL (ref 0–0.2)
IMM GRANULOCYTES NFR BLD AUTO: 0 % (ref 0–2)
LYMPHOCYTES # BLD AUTO: 1.32 THOUSANDS/ÂΜL (ref 0.6–4.47)
LYMPHOCYTES NFR BLD AUTO: 15 % (ref 14–44)
MCH RBC QN AUTO: 30.2 PG (ref 26.8–34.3)
MCHC RBC AUTO-ENTMCNC: 34.2 G/DL (ref 31.4–37.4)
MCV RBC AUTO: 88 FL (ref 82–98)
MONOCYTES # BLD AUTO: 0.8 THOUSAND/ÂΜL (ref 0.17–1.22)
MONOCYTES NFR BLD AUTO: 9 % (ref 4–12)
NEUTROPHILS # BLD AUTO: 6.38 THOUSANDS/ÂΜL (ref 1.85–7.62)
NEUTS SEG NFR BLD AUTO: 74 % (ref 43–75)
NRBC BLD AUTO-RTO: 0 /100 WBCS
PLATELET # BLD AUTO: 270 THOUSANDS/UL (ref 149–390)
PMV BLD AUTO: 9.9 FL (ref 8.9–12.7)
POTASSIUM SERPL-SCNC: 4.1 MMOL/L (ref 3.5–5.3)
PROT SERPL-MCNC: 7.8 G/DL (ref 6.4–8.4)
RBC # BLD AUTO: 4.5 MILLION/UL (ref 3.81–5.12)
SODIUM SERPL-SCNC: 135 MMOL/L (ref 135–147)
WBC # BLD AUTO: 8.65 THOUSAND/UL (ref 4.31–10.16)

## 2024-02-21 PROCEDURE — 80053 COMPREHEN METABOLIC PANEL: CPT | Performed by: EMERGENCY MEDICINE

## 2024-02-21 PROCEDURE — 36415 COLL VENOUS BLD VENIPUNCTURE: CPT | Performed by: EMERGENCY MEDICINE

## 2024-02-21 PROCEDURE — 99283 EMERGENCY DEPT VISIT LOW MDM: CPT

## 2024-02-21 PROCEDURE — 70491 CT SOFT TISSUE NECK W/DYE: CPT

## 2024-02-21 PROCEDURE — 85025 COMPLETE CBC W/AUTO DIFF WBC: CPT | Performed by: EMERGENCY MEDICINE

## 2024-02-21 PROCEDURE — 99284 EMERGENCY DEPT VISIT MOD MDM: CPT | Performed by: EMERGENCY MEDICINE

## 2024-02-21 RX ADMIN — IOHEXOL 100 ML: 350 INJECTION, SOLUTION INTRAVENOUS at 21:21

## 2024-02-22 NOTE — ED NOTES
Pt seen, assessed and d/c by provider. Pt appeared to be in no acute distress upon discharge. Pt able to ambulate well without assistance upon exiting.      Susan Ocasio RN  02/21/24 [id number]

## 2024-02-24 NOTE — ED PROVIDER NOTES
"History  Chief Complaint   Patient presents with    Dental Swelling     Patient has a broken left lower tooth. Finished Amoxicillin on 2/15. Saw urgent care for increased swelling and pain on the left side of her face and was started on Clindamycin yesterday. Patient thinks it has gotten worse.      Patient presents for evaluation of facial swelling and tooth pain.  States she had a broken left lower tooth.  She has seen dentist and was put on amoxicillin.  However she finished the course of that and felt like it was still swollen so she went to urgent care and they started on clindamycin yesterday.  Feels like it is not improving so he came here.  Denies any shortness of breath or difficulty swallowing.      History provided by:  Patient   used: No        Prior to Admission Medications   Prescriptions Last Dose Informant Patient Reported? Taking?   amoxicillin (AMOXIL) 500 mg capsule  Self Yes No   clindamycin (CLEOCIN) 300 MG capsule   No No   Sig: Take 1 capsule (300 mg total) by mouth 4 (four) times a day for 7 days      Facility-Administered Medications: None       Past Medical History:   Diagnosis Date    Fibrous dysplasia (monostotic), left forearm     \"3 spots on the bones\"    Varicella     As a child       History reviewed. No pertinent surgical history.    Family History   Problem Relation Age of Onset    Lupus Mother     Diabetes Mother         Possobly brought on by steroids.    Sarcoidosis Mother     Hypertension Father     Breast cancer Maternal Grandmother 50        Twice, as well as both her sisters.    Cancer Maternal Grandmother         Several of her family with varying forms.     I have reviewed and agree with the history as documented.    E-Cigarette/Vaping    E-Cigarette Use Never User      E-Cigarette/Vaping Substances    Nicotine No     THC No     CBD No     Flavoring No     Other No     Unknown No      Social History     Tobacco Use    Smoking status: Never     Passive " exposure: Past    Smokeless tobacco: Never    Tobacco comments:     None   Vaping Use    Vaping status: Never Used   Substance Use Topics    Alcohol use: Never    Drug use: Never       Review of Systems   HENT:  Positive for dental problem and facial swelling.    All other systems reviewed and are negative.      Physical Exam  Physical Exam  Vitals and nursing note reviewed.   Constitutional:       General: She is not in acute distress.  HENT:      Mouth/Throat:      Mouth: Mucous membranes are moist.      Pharynx: Oropharynx is clear.     Cardiovascular:      Rate and Rhythm: Normal rate and regular rhythm.   Pulmonary:      Effort: Pulmonary effort is normal. No respiratory distress.      Breath sounds: Normal breath sounds.   Neurological:      General: No focal deficit present.      Mental Status: She is alert and oriented to person, place, and time.         Vital Signs  ED Triage Vitals [02/21/24 1901]   Temperature Pulse Respirations Blood Pressure SpO2   98.8 °F (37.1 °C) 96 18 146/84 97 %      Temp Source Heart Rate Source Patient Position - Orthostatic VS BP Location FiO2 (%)   Tympanic Monitor Sitting Right arm --      Pain Score       5           Vitals:    02/21/24 1901   BP: 146/84   Pulse: 96   Patient Position - Orthostatic VS: Sitting         Visual Acuity      ED Medications  Medications   iohexol (OMNIPAQUE) 350 MG/ML injection (MULTI-DOSE) 100 mL (100 mL Intravenous Given 2/21/24 2121)       Diagnostic Studies  Results Reviewed       Procedure Component Value Units Date/Time    Comprehensive metabolic panel [[id number]]  (Abnormal) Collected: 02/21/24 1955    Lab Status: Final result Specimen: Blood from Arm, Left Updated: 02/21/24 2020     Sodium 135 mmol/L      Potassium 4.1 mmol/L      Chloride 102 mmol/L      CO2 24 mmol/L      ANION GAP 9 mmol/L      BUN 10 mg/dL      Creatinine 0.75 mg/dL      Glucose 90 mg/dL      Calcium 9.1 mg/dL      AST 12 U/L      ALT 15 U/L      Alkaline Phosphatase 76  U/L      Total Protein 7.8 g/dL      Albumin 4.2 g/dL      Total Bilirubin 0.54 mg/dL      eGFR 106 ml/min/1.73sq m     Narrative:      National Kidney Disease Foundation guidelines for Chronic Kidney Disease (CKD):     Stage 1 with normal or high GFR (GFR > 90 mL/min/1.73 square meters)    Stage 2 Mild CKD (GFR = 60-89 mL/min/1.73 square meters)    Stage 3A Moderate CKD (GFR = 45-59 mL/min/1.73 square meters)    Stage 3B Moderate CKD (GFR = 30-44 mL/min/1.73 square meters)    Stage 4 Severe CKD (GFR = 15-29 mL/min/1.73 square meters)    Stage 5 End Stage CKD (GFR <15 mL/min/1.73 square meters)  Note: GFR calculation is accurate only with a steady state creatinine    CBC and differential [[id number]] Collected: 02/21/24 1955    Lab Status: Final result Specimen: Blood from Arm, Left Updated: 02/21/24 2001     WBC 8.65 Thousand/uL      RBC 4.50 Million/uL      Hemoglobin 13.6 g/dL      Hematocrit 39.8 %      MCV 88 fL      MCH 30.2 pg      MCHC 34.2 g/dL      RDW 13.0 %      MPV 9.9 fL      Platelets 270 Thousands/uL      nRBC 0 /100 WBCs      Neutrophils Relative 74 %      Immat GRANS % 0 %      Lymphocytes Relative 15 %      Monocytes Relative 9 %      Eosinophils Relative 1 %      Basophils Relative 1 %      Neutrophils Absolute 6.38 Thousands/µL      Immature Grans Absolute 0.02 Thousand/uL      Lymphocytes Absolute 1.32 Thousands/µL      Monocytes Absolute 0.80 Thousand/µL      Eosinophils Absolute 0.08 Thousand/µL      Basophils Absolute 0.05 Thousands/µL                    CT soft tissue neck   Final Result by iNkunj Hodge DO (02/21 2202)      No CT evidence of acute findings            Workstation performed: [id number]                    Procedures  Procedures         ED Course                               SBIRT 22yo+      Flowsheet Row Most Recent Value   Initial Alcohol Screen: US AUDIT-C     1. How often do you have a drink containing alcohol? 0 Filed at: 02/21/2024 1904   2. How many drinks containing  alcohol do you have on a typical day you are drinking?  0 Filed at: 02/21/2024 1904   3a. Male UNDER 65: How often do you have five or more drinks on one occasion? 0 Filed at: 02/21/2024 1904   3b. FEMALE Any Age, or MALE 65+: How often do you have 4 or more drinks on one occassion? 0 Filed at: 02/21/2024 1904   Audit-C Score 0 Filed at: 02/21/2024 1904   ROBERT: How many times in the past year have you...    Used an illegal drug or used a prescription medication for non-medical reasons? Never Filed at: 02/21/2024 1904                      Medical Decision Making  Pulse ox 97% on room air indicating adequate oxygenation.        CT scan did not demonstrate any swelling or abscess.  Continue antibiotics and follow-up with dentistry as scheduled.    Amount and/or Complexity of Data Reviewed  Labs: ordered.  Radiology: ordered.    Risk  Prescription drug management.             Disposition  Final diagnoses:   Dental infection     Time reflects when diagnosis was documented in both MDM as applicable and the Disposition within this note       Time User Action Codes Description Comment    2/21/2024 10:05 PM Yogesh Mejia Add [K04.7] Dental infection           ED Disposition       ED Disposition   Discharge    Condition   Stable    Date/Time   Wed Feb 21, 2024 2205    Comment   Kalpana Reeves discharge to home/self care.                   Follow-up Information       Follow up With Specialties Details Why Contact Info    Dentist as scheduled                Discharge Medication List as of 2/21/2024 10:09 PM        CONTINUE these medications which have NOT CHANGED    Details   amoxicillin (AMOXIL) 500 mg capsule Historical Med      clindamycin (CLEOCIN) 300 MG capsule Take 1 capsule (300 mg total) by mouth 4 (four) times a day for 7 days, Starting Tue 2/20/2024, Until Tue 2/27/2024, Normal             No discharge procedures on file.    PDMP Review       None            ED Provider  Electronically Signed by             Yogesh PAZ  DO Roberto  02/23/24 2239

## 2024-03-18 ENCOUNTER — OFFICE VISIT (OUTPATIENT)
Dept: OBGYN CLINIC | Facility: CLINIC | Age: 34
End: 2024-03-18
Payer: COMMERCIAL

## 2024-03-18 VITALS
DIASTOLIC BLOOD PRESSURE: 78 MMHG | HEIGHT: 66 IN | BODY MASS INDEX: 41.3 KG/M2 | SYSTOLIC BLOOD PRESSURE: 122 MMHG | WEIGHT: 257 LBS

## 2024-03-18 DIAGNOSIS — N92.6 IRREGULAR MENSES: Primary | ICD-10-CM

## 2024-03-18 PROCEDURE — 99214 OFFICE O/P EST MOD 30 MIN: CPT | Performed by: STUDENT IN AN ORGANIZED HEALTH CARE EDUCATION/TRAINING PROGRAM

## 2024-03-18 NOTE — PROGRESS NOTES
[facility]  Irregular and painful menses  Deb Sanchez MD  03/18/24    Assessment/Plan:  Marian is a 33-year-old G0 sexually active female who uses condoms for contraception presenting for concerns of irregular menses and menorrhagia-she was reading for PCOS and is concerned she may have PCOS.  We did review today the wide and varying fragile diagnosis of irregular menses including structural and nonstructural causes.  On history taking today does appear she does have varying cycles and sometimes skipped menses.  Will plan for blood testing including thyroid and testosterone levels  Will plan for ultrasound to rule out structural causes as well as evaluation of her ovaries for polycystic ovaries.  Patient will return to office in 2 months for follow-up and review of results and management options.  All questions and concerns answered to the best my ability.     Diagnoses and all orders for this visit:    Irregular menses  -     US pelvis complete w transvaginal; Future  -     Testosterone; Future  -     TSH, 3rd generation; Future  -     T4, free; Future      Subjective:      Patient ID: Kalpana Reeves is a 33 y.o. female.    HPI  Kalpana is a 33-year-old G0 sexually active female using condoms and fertility tracking for contraception presenting to discuss irregular and painful menses.  Patient reports menarche was in elementary school and her menses have always been extremely heavy and painful-she would many times miss school and work can [place] to discomfort.  Menses used to be monthly and they did become irregular a few years ago-she was started on birth control packs that would stop menses for 3 months at a time and did discontinue this last year.  Goals continue to be irregular- sometimes skip menses for a month-cycle itself varies from 3 weeks to 2 months. Menses last about 4-7 days. Has changed her diet and noticed that her menses did become a little more regular by decreasing her  "well-hydrated sugars.  Changes menstrual cup every few hours- Motrin for dysmenorrhea helps.    Marian is currently not interested in pregnancy but may be in the future-she uses condoms and fertility tracking to prevent pregnancy.  Denies any discharge, vaginal odor or pruritus    The following portions of the patient's history were reviewed and updated as appropriate: allergies, current medications, past family history, past medical history, past social history, past surgical history, and problem list.    Review of Systems    Per HPI    Objective:    /78 (BP Location: Right arm, Patient Position: Sitting, Cuff Size: Large)   Ht 5' 6\" (1.676 m)   Wt 117 kg (257 lb)   LMP 03/03/2024 (Exact Date)   BMI 41.48 kg/m²      Physical Exam  Vitals reviewed.   Constitutional:       Appearance: Normal appearance. She is obese. She is not ill-appearing or diaphoretic.   HENT:      Head: Normocephalic and atraumatic.   Cardiovascular:      Rate and Rhythm: Normal rate.   Pulmonary:      Effort: Pulmonary effort is normal. No respiratory distress.   Abdominal:      Palpations: Abdomen is soft.      Tenderness: There is no abdominal tenderness. There is no guarding or rebound.   Genitourinary:     Comments: Vulva normal without lesions, urethra midline without prolapse.  Bartholin's and Tigard's glands within normal limits.  On speculum exam the vagina and cervix appear grossly normal without any lesions noted.  No discharge or bleeding noted.  On bimanual exam there is an anteverted and mobile uterus no CMT, no adnexal masses appreciated.  Exam is limited by patient body habitus.  Musculoskeletal:      Right lower leg: No edema.      Left lower leg: No edema.   Skin:     General: Skin is warm and dry.   Neurological:      Mental Status: She is alert and oriented to person, place, and time.   Psychiatric:         Mood and Affect: Mood normal.         Behavior: Behavior normal.           "

## 2024-03-18 NOTE — PATIENT INSTRUCTIONS
Polycystic Ovarian Syndrome   AMBULATORY CARE:   Polycystic ovarian syndrome (PCOS)  is a group of symptoms caused by a hormone disorder. Your body produces too many hormones and your ovaries do not work correctly. Your ovaries have fluid-filled sacs with an immature egg in each one. These are called follicles. The follicles grow bigger and make your ovaries look like they have cysts in them. PCOS increases your risk for endometrial cancer and infertility.       Common signs and symptoms:   Irregular or absent monthly periods    Extreme hair growth on your face, chest, and back    Acne, thinning hair or baldness on your scalp    Weight gain, especially around your abdomen    High blood sugar levels or high blood pressure    Periods of extreme sadness and extreme happiness    Difficulty getting pregnant    Darkening of the skin around your neck creases, groin, and under your breasts    Skin tags in your armpits, or on our neck    Call your doctor or gynecologist if:   Your symptoms get worse.    You have questions or concerns about your condition or care.    Treatment for PCOS  may include any of the following:  Medicines  may be given to control your blood sugar. You may need medicines to decrease male hormones, and increase female hormones. These medicines may also improve acne, baldness and hair growth you do not want. You may also need medicine to help you ovulate if you want to get pregnant.    Lifestyle changes:      Manage other health conditions.  PCOS increases your risk for diabetes, heart disease, and high blood pressure. Your healthcare provider may send you to specialists that teach you how to manage these conditions.    Maintain a healthy weight.  Ask your healthcare provider how much you should weigh. Ask him or her to help you create a weight loss plan if you are overweight. Weight loss can help reduce the symptoms of PCOS. You and your healthcare provider can set manageable weight loss  goals.    Exercise as directed.  Exercise can help keep your blood sugar level steady, lower your risk for heart disease, and help you lose weight. Exercise for at least 150 minutes every week. Spread this amount of exercise over at least 3 days in the week. Do not skip exercise more than 2 days in a row. Include muscle strengthening activities 2 to 3 days each week. Examples of exercise include walking or swimming. Do not sit for longer than 30 minutes at a time. Work with your healthcare provider to create an exercise plan that is right for you.            Eat a variety of healthy foods.  Healthy foods include fruits, vegetables, whole-grain breads, low-fat dairy products, beans, lean meats, and fish. A dietitian may help you plan meals to help manage your other health conditions.       Follow up with your doctor or gynecologist as directed:  You may need to return for more tests or to check if the treatment is working. Write down your questions so you remember to ask them during your visits.  © Copyright [employer] 2023 Information is for End User's use only and may not be sold, redistributed or otherwise used for commercial purposes.  The above information is an  only. It is not intended as medical advice for individual conditions or treatments. Talk to your doctor, nurse or pharmacist before following any medical regimen to see if it is safe and effective for you.

## 2024-03-26 ENCOUNTER — HOSPITAL ENCOUNTER (OUTPATIENT)
Dept: RADIOLOGY | Facility: HOSPITAL | Age: 34
Discharge: HOME | End: 2024-03-26
Attending: STUDENT IN AN ORGANIZED HEALTH CARE EDUCATION/TRAINING PROGRAM
Payer: COMMERCIAL

## 2024-03-26 DIAGNOSIS — N92.6 IRREGULAR MENSES: ICD-10-CM

## 2024-03-26 PROCEDURE — 76830 TRANSVAGINAL US NON-OB: CPT

## 2024-03-26 PROCEDURE — 76856 US EXAM PELVIC COMPLETE: CPT

## 2024-05-09 ENCOUNTER — OFFICE VISIT (OUTPATIENT)
Dept: FAMILY MEDICINE CLINIC | Facility: CLINIC | Age: 34
End: 2024-05-09
Payer: COMMERCIAL

## 2024-05-09 ENCOUNTER — TELEPHONE (OUTPATIENT)
Age: 34
End: 2024-05-09

## 2024-05-09 ENCOUNTER — TELEPHONE (OUTPATIENT)
Dept: ADMINISTRATIVE | Facility: OTHER | Age: 34
End: 2024-05-09

## 2024-05-09 VITALS
HEART RATE: 111 BPM | HEIGHT: 66 IN | BODY MASS INDEX: 41.46 KG/M2 | RESPIRATION RATE: 16 BRPM | WEIGHT: 258 LBS | SYSTOLIC BLOOD PRESSURE: 120 MMHG | DIASTOLIC BLOOD PRESSURE: 80 MMHG | TEMPERATURE: 99.1 F | OXYGEN SATURATION: 97 %

## 2024-05-09 DIAGNOSIS — R73.03 PREDIABETES: ICD-10-CM

## 2024-05-09 DIAGNOSIS — Z00.00 ANNUAL PHYSICAL EXAM: Primary | ICD-10-CM

## 2024-05-09 DIAGNOSIS — E78.1 HYPERTRIGLYCERIDEMIA: ICD-10-CM

## 2024-05-09 DIAGNOSIS — E66.01 MORBID OBESITY WITH BMI OF 40.0-44.9, ADULT (HCC): ICD-10-CM

## 2024-05-09 DIAGNOSIS — E55.9 VITAMIN D DEFICIENCY: ICD-10-CM

## 2024-05-09 PROCEDURE — 99395 PREV VISIT EST AGE 18-39: CPT | Performed by: NURSE PRACTITIONER

## 2024-05-09 NOTE — PROGRESS NOTES
[facility] HEALTH MAINTENANCE OFFICE VISIT  [facility] - [facility] PRACTICE    NAME: Kalpana Reeves  AGE: 33 y.o. SEX: female  : 1990     DATE: 2024    Assessment and Plan   1. Annual physical exam  Heart healthy, carbohydrate controlled diet- limit red meat, limit saturated fat, moderate salt intake, limit junk food, etc.   Regular exercise  Stress management  Routine labwork and screenings as ordered.     2. Morbid obesity with BMI of 40.0-44.9, adult (HCC)  Weight loss is recommended to improve overall health.   Dietary changes- limit carbohydrates, decrease overall caloric intake, reduce portion sizes, healthier snack choices, limit saturated fats, increase intake of fruits and vegetables, limit junk food.   Increase exercise to 3-5 times per week. Consider adding strength exercises to exercise routine.     3. Vitamin D deficiency  - Vitamin D 25 hydroxy; Future  - Comprehensive metabolic panel; Future    4. Prediabetes  - Hemoglobin A1C; Future  - Comprehensive metabolic panel; Future    5. Hypertriglyceridemia  - Comprehensive metabolic panel; Future  - Lipid panel; Future        Patient Counseling:   Nutrition: Stressed importance of a well balanced diet, moderation of sodium/saturated fat, caloric balance and sufficient intake of fiber  Exercise: Stressed the importance of regular exercise with a goal of 150 minutes per week  Dental Health: Discussed daily flossing and brushing and regular dental visits     Immunizations reviewed: Risks and Benefits discussed  Discussed benefits of:  Cervical Cancer screening and Screening labs.  BMI Counseling: Body mass index is 42.28 kg/m². Discussed with patient's BMI with her. The BMI is above normal. Nutrition recommendations include reducing portion sizes and decreasing overall calorie intake. Exercise recommendations include exercising 3-5 times per week.            Chief Complaint     Chief Complaint   Patient  presents with    Physical Exam       History of Present Illness     Here for annual visit  Following with gyn for pcos.   Generally feels well.   No medications.           Well Adult Physical   Patient here for a comprehensive physical exam.      Diet and Physical Activity  Diet: well balanced diet; trying to watch   Exercise: several times per week ; exercises at least 30 minutes 3-4 x per day, usually cardio     Depression Screen  PHQ-2/9 Depression Screening    Little interest or pleasure in doing things: 0 - not at all  Feeling down, depressed, or hopeless: 0 - not at all  PHQ-2 Score: 0  PHQ-2 Interpretation: Negative depression screen     Depression Screening Follow-up Plan: Patient's depression screening was negative with a PHQ-2 score of 0. Clinically patient does not have depression. No treatment is required.       General Health  Hearing: Normal:  bilateral  Vision: no vision problems  Dental: regular dental visits    Reproductive Health  Follows with gynecologist      The following portions of the patient's history were reviewed and updated as appropriate: allergies, current medications, past family history, past medical history, past social history, past surgical history and problem list.    Review of Systems     Review of Systems   Constitutional:  Negative for chills, diaphoresis, fatigue and fever.   HENT:  Negative for congestion, ear pain, sinus pressure, sinus pain and sore throat.    Eyes:  Negative for visual disturbance.   Respiratory:  Negative for cough, chest tightness, shortness of breath and wheezing.    Cardiovascular:  Negative for chest pain, palpitations and leg swelling.   Gastrointestinal:  Negative for abdominal distention, abdominal pain, diarrhea and nausea.   Endocrine: Negative for polydipsia.   Genitourinary:  Negative for dysuria, frequency and urgency.   Musculoskeletal:  Negative for arthralgias, back pain and myalgias.   Allergic/Immunologic: Negative for immunocompromised  "state.   Neurological:  Negative for dizziness, weakness and headaches.   Hematological:  Negative for adenopathy.   Psychiatric/Behavioral:  Negative for dysphoric mood. The patient is not nervous/anxious.    All other systems reviewed and are negative.      Past Medical History     Past Medical History:   Diagnosis Date    Allergic ?    I meant to ask about food allergy testing. Possible egg allergy.    Fibrous dysplasia (monostotic), left forearm     \"3 spots on the bones\"    HL (hearing loss) X    Technically not loss. Concerned i have issues processing hearing.    Obesity Currently.    Varicella     As a child       Past Surgical History     History reviewed. No pertinent surgical history.    Social History     Social History     Socioeconomic History    Marital status: /Civil Union     Spouse name: None    Number of children: None    Years of education: None    Highest education level: None   Occupational History    None   Tobacco Use    Smoking status: Never     Passive exposure: Past    Smokeless tobacco: Never    Tobacco comments:     None   Vaping Use    Vaping status: Never Used   Substance and Sexual Activity    Alcohol use: Never    Drug use: Never    Sexual activity: Yes     Partners: Male     Birth control/protection: Condom Male, OCP   Other Topics Concern    None   Social History Narrative    None     Social Determinants of Health     Financial Resource Strain: Not on file   Food Insecurity: Not on file   Transportation Needs: Not on file   Physical Activity: Not on file   Stress: Not on file   Social Connections: Not on file   Intimate Partner Violence: Not on file   Housing Stability: Not on file       Family History     Family History   Problem Relation Age of Onset    Lupus Mother     Diabetes Mother         Possobly brought on by steroids.    Sarcoidosis Mother     Mental illness Mother     Depression Mother         Her and I believe her mother.    Autoimmune disease Mother         " "Lupus, sarcoidosis.    Anxiety disorder Mother     Bipolar disorder Mother     OCD Mother     Hypertension Father     Alcohol abuse Father         His biological father.    Mental illness Father         PTSD from serving in army.    Breast cancer Maternal Grandmother 50        Twice, as well as both her sisters.    Cancer Maternal Grandmother         Several of her family with varying forms.    Arthritis Maternal Grandmother     Breast cancer additional onset Maternal Grandmother     Bipolar disorder Maternal Grandmother     COPD Maternal Grandfather         From smoking.    Anxiety disorder Maternal Uncle        Current Medications     No current outpatient medications on file.     Allergies     Allergies   Allergen Reactions    Nickel Itching    Eggs Or Egg-Derived Products - Food Allergy GI Intolerance       Objective     /80 (BP Location: Right arm, Patient Position: Sitting, Cuff Size: Large)   Pulse (!) 111   Temp 99.1 °F (37.3 °C)   Resp 16   Ht 5' 5.5\" (1.664 m)   Wt 117 kg (258 lb)   SpO2 97%   BMI 42.28 kg/m²      Physical Exam  Vitals reviewed.   Constitutional:       General: She is not in acute distress.     Appearance: Normal appearance. She is well-developed. She is obese. She is not ill-appearing.   HENT:      Head: Normocephalic and atraumatic.      Right Ear: Tympanic membrane and ear canal normal.      Left Ear: Tympanic membrane and ear canal normal.      Nose: Nose normal.      Mouth/Throat:      Mouth: Mucous membranes are moist.      Pharynx: Oropharynx is clear.   Eyes:      General: No scleral icterus.     Extraocular Movements: Extraocular movements intact.      Pupils: Pupils are equal, round, and reactive to light.   Neck:      Thyroid: No thyromegaly.   Cardiovascular:      Rate and Rhythm: Normal rate and regular rhythm.      Heart sounds: Normal heart sounds. No murmur heard.  Pulmonary:      Effort: Pulmonary effort is normal. No respiratory distress.      Breath sounds: " Normal breath sounds. No wheezing or rales.   Abdominal:      General: Bowel sounds are normal. There is no distension.      Palpations: Abdomen is soft.      Tenderness: There is no abdominal tenderness.   Musculoskeletal:         General: Normal range of motion.      Cervical back: Normal range of motion and neck supple.   Lymphadenopathy:      Cervical: No cervical adenopathy.   Skin:     General: Skin is warm and dry.      Coloration: Skin is not jaundiced or pale.   Neurological:      General: No focal deficit present.      Mental Status: She is alert and oriented to person, place, and time.      Cranial Nerves: No cranial nerve deficit.      Sensory: No sensory deficit.      Coordination: Coordination normal.      Gait: Gait normal.   Psychiatric:         Mood and Affect: Mood normal.         Behavior: Behavior normal.         Thought Content: Thought content normal.         Judgment: Judgment normal.               HILLARY Grubbs  [facility]

## 2024-05-09 NOTE — LETTER
Vaccination Request Form: COVID-19 aka SARS-CoV-2 (Moderna or Pfizer or J & J)      Date Requested: 24  Patient: Kalpana Reeves  Patient : [id number]   Referring Provider: HILLARY Grubbs       The above patient has informed us that they have had their   most recent COVID-19 aka SARS-CoV-2 (Moderna or Pfizer or J & J) administered at your facility. Please   complete this form and attach all corresponding documentation.    Date of Vaccine(s) Given  ___[id number]_____________________    Lot Number(s) ___[id number]_________________________________    Manufacture(s) _PFIZER __________________________________    Dose Amount (s) ______.30 _______________________________    Expiration Date(s) ______[id number]________________________    Comments __________________________________________________________  ____________________________________________________________________  ____________________________________________________________________  ____________________________________________________________________    Administering Facility  [facility]______________    Vaccine Administered By (print name) ___________________________________      Form Completed By (print name) _______________________________________      Signature ___________________________________________________________      These reports are needed for  compliance.    Please fax this completed form and a copy of the Vaccine Document(s) to our office located at [address] [place] [address] as soon as possible to Fax [phone] den Rashid: Phone [phone]    We thank you for your assistance in treating our mutual patient.

## 2024-05-09 NOTE — TELEPHONE ENCOUNTER
Upon review of the In Basket request we were able to locate, review, and update the patient chart as requested for Immunization(s) 10- COVID BOOSTER.    Any additional questions or concerns should be emailed to the Practice Liaisons via the appropriate education email address, please do not reply via In Basket.    Thank you  Gay Walls

## 2024-05-09 NOTE — TELEPHONE ENCOUNTER
----- Message from Addie Abbasi sent at 5/9/2024 10:17 AM EDT -----  Regarding: COVID vaccine and flu vaccine  05/09/24 10:17 AM    Hello, our patient attached above has had Immunization(s) completed/performed. Please assist in updating the patient chart by making an External outreach to [facility]  facility located in [place]. The date of service is 2023.    Thank you,  Addie MARC

## 2024-05-09 NOTE — TELEPHONE ENCOUNTER
Patient called requesting the lab slips from her visit 3/18 to be printed so she can pick them up as she has to use a lab outside of the network.

## 2024-05-09 NOTE — TELEPHONE ENCOUNTER
Spoke with pt and states she will  from the [place] office as she is in the area.  Labs printed out for her.

## 2024-05-11 LAB
25(OH)D3 SERPL-MCNC: 40 NG/ML (ref 30–100)
ALBUMIN SERPL-MCNC: 4.2 G/DL (ref 3.6–5.1)
ALBUMIN/GLOB SERPL: 1.2 (CALC) (ref 1–2.5)
ALP SERPL-CCNC: 97 U/L (ref 31–125)
ALT SERPL-CCNC: 18 U/L (ref 6–29)
AST SERPL-CCNC: 15 U/L (ref 10–30)
BILIRUB SERPL-MCNC: 0.4 MG/DL (ref 0.2–1.2)
BUN SERPL-MCNC: 11 MG/DL (ref 7–25)
BUN/CREAT SERPL: NORMAL (CALC) (ref 6–22)
CALCIUM SERPL-MCNC: 9.3 MG/DL (ref 8.6–10.2)
CHLORIDE SERPL-SCNC: 104 MMOL/L (ref 98–110)
CHOLEST SERPL-MCNC: 151 MG/DL
CHOLEST/HDLC SERPL: 3.2 (CALC)
CO2 SERPL-SCNC: 27 MMOL/L (ref 20–32)
CREAT SERPL-MCNC: 0.79 MG/DL (ref 0.5–0.97)
GFR/BSA.PRED SERPLBLD CYS-BASED-ARV: 102 ML/MIN/1.73M2
GLOBULIN SER CALC-MCNC: 3.5 G/DL (CALC) (ref 1.9–3.7)
GLUCOSE SERPL-MCNC: 84 MG/DL (ref 65–99)
HBA1C MFR BLD: 5.9 % OF TOTAL HGB
HDLC SERPL-MCNC: 47 MG/DL
LDLC SERPL CALC-MCNC: 83 MG/DL (CALC)
NONHDLC SERPL-MCNC: 104 MG/DL (CALC)
POTASSIUM SERPL-SCNC: 4.1 MMOL/L (ref 3.5–5.3)
PROT SERPL-MCNC: 7.7 G/DL (ref 6.1–8.1)
SODIUM SERPL-SCNC: 139 MMOL/L (ref 135–146)
TRIGL SERPL-MCNC: 118 MG/DL

## 2024-05-14 ENCOUNTER — TELEMEDICINE (OUTPATIENT)
Dept: FAMILY MEDICINE CLINIC | Facility: CLINIC | Age: 34
End: 2024-05-14
Payer: COMMERCIAL

## 2024-05-14 VITALS — BODY MASS INDEX: 41.46 KG/M2 | WEIGHT: 258 LBS | HEIGHT: 66 IN

## 2024-05-14 DIAGNOSIS — E55.9 VITAMIN D DEFICIENCY: ICD-10-CM

## 2024-05-14 DIAGNOSIS — E78.1 HYPERTRIGLYCERIDEMIA: ICD-10-CM

## 2024-05-14 DIAGNOSIS — R73.03 PREDIABETES: Primary | ICD-10-CM

## 2024-05-14 LAB
T4 FREE SERPL-MCNC: 1.1 NG/DL (ref 0.8–1.8)
TESTOST SERPL-MCNC: 33 NG/DL (ref 2–45)
TSH SERPL-ACNC: 3.03 MIU/L

## 2024-05-14 PROCEDURE — 99213 OFFICE O/P EST LOW 20 MIN: CPT | Performed by: NURSE PRACTITIONER

## 2024-05-14 NOTE — PATIENT INSTRUCTIONS
Heart healthy, carbohydrate controlled diet- limit red meat, limit saturated fat, moderate salt intake, limit junk food, etc.   Regular exercise  Stress management  Continue vitamin D3 1000 units daily.

## 2024-05-14 NOTE — PROGRESS NOTES
Virtual Regular Visit    Verification of patient location:    Patient is located at Home in the following state in which I hold an active license [place]      Assessment/Plan:  1. Prediabetes  Carb controlled diet. Regular exercise    2. Vitamin D deficiency  Continue vitamin D3 1000 units daily    3. Hypertriglyceridemia  Heart healthy diet. Regular exercise.     Problem List Items Addressed This Visit    None           Reason for visit is   Chief Complaint   Patient presents with    Follow-up     Lab review    Virtual Regular Visit          Encounter provider HILLARY Grubbs      Recent Visits  Date Type Provider Dept   05/09/24 Office Visit HILLARY Grubbs [facility]   Showing recent visits within past 7 days and meeting all other requirements  Today's Visits  Date Type Provider Dept   05/14/24 Telemedicine HILLARY Grubbs [facility]   Showing today's visits and meeting all other requirements  Future Appointments  No visits were found meeting these conditions.  Showing future appointments within next 150 days and meeting all other requirements       The patient was identified by name and date of birth. Kalpana Reeves was informed that this is a telemedicine visit and that the visit is being conducted through the [employer] platform. She agrees to proceed..  My office door was closed. No one else was in the room.  She acknowledged consent and understanding of privacy and security of the video platform. The patient has agreed to participate and understands they can discontinue the visit at any time.    Patient is aware this is a billable service.     Subjective  Kalpana Reeves is a 33 y.o. female , lab review      Lab review  Follows with gyn for pcos.   No prescription meds currently. Does take vitamin D3 1000 units daily  Feels well  No specific concerns or issues.            Past Medical History:   Diagnosis Date    Allergic ?    I meant to ask about food allergy testing. Possible egg allergy.  "   Fibrous dysplasia (monostotic), left forearm     \"3 spots on the bones\"    HL (hearing loss) X    Technically not loss. Concerned i have issues processing hearing.    Obesity Currently.    Varicella     As a child       History reviewed. No pertinent surgical history.    No current outpatient medications on file.     No current facility-administered medications for this visit.        Allergies   Allergen Reactions    Nickel Itching    Eggs Or Egg-Derived Products - Food Allergy GI Intolerance       Review of Systems   Constitutional:  Negative for fatigue.   Allergic/Immunologic: Negative for immunocompromised state.   Psychiatric/Behavioral:  Negative for dysphoric mood. The patient is not nervous/anxious.        Video Exam    Vitals:    05/14/24 1011   Weight: 117 kg (258 lb)   Height: 5' 5.5\" (1.664 m)     Recent Results (from the past 672 hour(s))   Lipid panel    Collection Time: 05/10/24  8:15 AM   Result Value Ref Range    Total Cholesterol 151 <200 mg/dL    HDL 47 (L) > OR = 50 mg/dL    Triglycerides 118 <150 mg/dL    LDL Calculated 83 mg/dL (calc)    Chol HDLC Ratio 3.2 <5.0 (calc)    Non-HDL Cholesterol 104 <130 mg/dL (calc)   Comprehensive metabolic panel    Collection Time: 05/10/24  8:15 AM   Result Value Ref Range    Glucose, Random 84 65 - 99 mg/dL    BUN 11 7 - 25 mg/dL    Creatinine 0.79 0.50 - 0.97 mg/dL    eGFR 102 > OR = 60 mL/min/1.73m2    SL AMB BUN/CREATININE RATIO SEE NOTE: 6 - 22 (calc)    Sodium 139 135 - 146 mmol/L    Potassium 4.1 3.5 - 5.3 mmol/L    Chloride 104 98 - 110 mmol/L    CO2 27 20 - 32 mmol/L    Calcium 9.3 8.6 - 10.2 mg/dL    Protein, Total 7.7 6.1 - 8.1 g/dL    Albumin 4.2 3.6 - 5.1 g/dL    Globulin 3.5 1.9 - 3.7 g/dL (calc)    Albumin/Globulin Ratio 1.2 1.0 - 2.5 (calc)    TOTAL BILIRUBIN 0.4 0.2 - 1.2 mg/dL    Alkaline Phosphatase 97 31 - 125 U/L    AST 15 10 - 30 U/L    ALT 18 6 - 29 U/L   Vitamin D 25 hydroxy    Collection Time: 05/10/24  8:15 AM   Result Value Ref " Range    Vitamin D, 25-Hydroxy, Serum 40 30 - 100 ng/mL   Hemoglobin A1c (w/out EAG)    Collection Time: 05/10/24  8:15 AM   Result Value Ref Range    Hemoglobin A1C 5.9 (H) <5.7 % of total Hgb   Testosterone, Total, LC/MS/MS    Collection Time: 05/10/24  8:20 AM   Result Value Ref Range    Testosterone, Total, LC/MS     T4, free    Collection Time: 05/10/24  8:20 AM   Result Value Ref Range    Free t4 1.1 0.8 - 1.8 ng/dL   TSH, 3rd generation    Collection Time: 05/10/24  8:20 AM   Result Value Ref Range    TSH 3.03 mIU/L         Physical Exam  Constitutional:       General: She is not in acute distress.     Appearance: She is not ill-appearing.   Pulmonary:      Effort: Pulmonary effort is normal.   Skin:     Coloration: Skin is not jaundiced or pale.   Neurological:      Mental Status: She is alert and oriented to person, place, and time.   Psychiatric:         Mood and Affect: Mood normal.         Behavior: Behavior normal.         Thought Content: Thought content normal.         Judgment: Judgment normal.          Visit Time  Total Visit Duration: 8 minutes spent reviewing record, updating history, reviewing labs and medications, and documenting in record.   Patient was counseled regarding instructions for management which included: impression/diagnosis, risk/benefits of treatment options, importance of compliance with treatment, risk factor reduction, and prognosis.   I have reviewed the instructions with the patient answering all questions and patient verbalized understanding.

## 2024-06-10 ENCOUNTER — OFFICE VISIT (OUTPATIENT)
Dept: OBGYN CLINIC | Facility: CLINIC | Age: 34
End: 2024-06-10
Payer: COMMERCIAL

## 2024-06-10 VITALS
SYSTOLIC BLOOD PRESSURE: 130 MMHG | WEIGHT: 258 LBS | BODY MASS INDEX: 41.46 KG/M2 | HEIGHT: 66 IN | DIASTOLIC BLOOD PRESSURE: 84 MMHG

## 2024-06-10 DIAGNOSIS — E66.01 MORBID OBESITY WITH BMI OF 40.0-44.9, ADULT (HCC): Primary | ICD-10-CM

## 2024-06-10 DIAGNOSIS — N92.6 IRREGULAR MENSES: Primary | ICD-10-CM

## 2024-06-10 PROCEDURE — 99213 OFFICE O/P EST LOW 20 MIN: CPT | Performed by: STUDENT IN AN ORGANIZED HEALTH CARE EDUCATION/TRAINING PROGRAM

## 2024-06-10 NOTE — PROGRESS NOTES
"[facility]  Irregular menses follow-up  Deb Sanchez MD  06/10/24      Assessment/Plan:   33 yo G0 sexually active female with condoms for contraception presenting to discuss recent blood work and irregular menses- menses have seemed to regulate somewhat more of recent.  We reviewed her normal blood work including thyroid function testing and normal testosterone level.  We discussed her pelvic ultrasound which showed normal ovaries without signs of being polycystic.  We did review recommendations for weight loss and that many times at least 10% of weight loss can help any anovulatory bleeding that occurs- We discussed weight loss management, medical therapies such as GLP-1 inhibitors and her being candidate for surgical therapy.    At this time she declines referral to weight loss management but will consider and return call to office if she desires.  Return to office for annual well woman care.    Subjective:     Patient ID: Kalpana Reeves is a 34 y.o. female.    HPI  Marian is a 34-year-old G0 presenting to discuss lab work and imaging as a workup for irregular menses-  She reports overall her menses have become somewhat more regular and at times she is having cycles that are  by 36 days.  Uses condoms and fertility tracking for contraceptive purposes.  She is not currently planning for having a child in the near future.    Review of Systems    Per [facility]    Objective:  /84 (BP Location: Left arm, Patient Position: Sitting)   Ht 5' 5.5\" (1.664 m)   Wt 117 kg (258 lb)   LMP 05/23/2024 (Approximate)   BMI 42.28 kg/m²    Physical Exam  Vitals reviewed.   Constitutional:       Appearance: Normal appearance. She is not ill-appearing or diaphoretic.   HENT:      Head: Normocephalic and atraumatic.   Pulmonary:      Effort: Pulmonary effort is normal.   Musculoskeletal:      Right lower leg: No edema.      Left lower leg: No edema.   Skin:     General: Skin is warm and dry. "   Neurological:      Mental Status: She is alert and oriented to person, place, and time.   Psychiatric:         Mood and Affect: Mood normal.         Behavior: Behavior normal.

## 2024-08-28 ENCOUNTER — ANNUAL EXAM (OUTPATIENT)
Dept: OBGYN CLINIC | Facility: CLINIC | Age: 34
End: 2024-08-28
Payer: COMMERCIAL

## 2024-08-28 ENCOUNTER — OFFICE VISIT (OUTPATIENT)
Dept: BARIATRICS | Facility: CLINIC | Age: 34
End: 2024-08-28
Payer: COMMERCIAL

## 2024-08-28 VITALS
HEART RATE: 100 BPM | BODY MASS INDEX: 41.25 KG/M2 | SYSTOLIC BLOOD PRESSURE: 110 MMHG | HEIGHT: 67 IN | DIASTOLIC BLOOD PRESSURE: 80 MMHG | WEIGHT: 262.8 LBS

## 2024-08-28 VITALS
HEART RATE: 107 BPM | DIASTOLIC BLOOD PRESSURE: 70 MMHG | SYSTOLIC BLOOD PRESSURE: 120 MMHG | WEIGHT: 266 LBS | OXYGEN SATURATION: 97 % | BODY MASS INDEX: 43.59 KG/M2

## 2024-08-28 DIAGNOSIS — E66.01 MORBID OBESITY WITH BMI OF 40.0-44.9, ADULT (HCC): ICD-10-CM

## 2024-08-28 DIAGNOSIS — E66.01 CLASS 3 SEVERE OBESITY DUE TO EXCESS CALORIES WITH SERIOUS COMORBIDITY AND BODY MASS INDEX (BMI) OF 40.0 TO 44.9 IN ADULT (HCC): Primary | ICD-10-CM

## 2024-08-28 DIAGNOSIS — Z01.419 WOMEN'S ANNUAL ROUTINE GYNECOLOGICAL EXAMINATION: Primary | ICD-10-CM

## 2024-08-28 DIAGNOSIS — Z23 NEED FOR HPV VACCINATION: ICD-10-CM

## 2024-08-28 PROBLEM — E66.813 CLASS 3 SEVERE OBESITY DUE TO EXCESS CALORIES WITH SERIOUS COMORBIDITY AND BODY MASS INDEX (BMI) OF 40.0 TO 44.9 IN ADULT (HCC): Status: ACTIVE | Noted: 2023-05-02

## 2024-08-28 PROCEDURE — 90471 IMMUNIZATION ADMIN: CPT | Performed by: NURSE PRACTITIONER

## 2024-08-28 PROCEDURE — S0612 ANNUAL GYNECOLOGICAL EXAMINA: HCPCS | Performed by: NURSE PRACTITIONER

## 2024-08-28 PROCEDURE — 99204 OFFICE O/P NEW MOD 45 MIN: CPT | Performed by: INTERNAL MEDICINE

## 2024-08-28 PROCEDURE — 90651 9VHPV VACCINE 2/3 DOSE IM: CPT | Performed by: NURSE PRACTITIONER

## 2024-08-28 RX ORDER — METFORMIN HCL 500 MG
TABLET, EXTENDED RELEASE 24 HR ORAL
Qty: 60 TABLET | Refills: 3 | Status: SHIPPED | OUTPATIENT
Start: 2024-08-28

## 2024-08-28 NOTE — PROGRESS NOTES
"  Subjective    HPI:     Kalpana Reeves is a 34 y.o. nulliparous female. Her menstrual cycles are about 21 days, lasting 4-5 days. Her current method of contraception includes condoms. In a stable  relationship for almost 4 yrs. She denies issues with intimacy. She denies /GI and Gyn complaints. She feels safe at home. She denies depression/anxiety.  Medical, surgical and family history reviewed. Her dental care is up-to-date. She eats a healthy diet and exercises regularly. She is happy with her weight.     Visit Vitals  /70   Pulse (!) 107   Wt 121 kg (266 lb)   LMP 2024   SpO2 97%   BMI 43.59 kg/m²   OB Status Having periods   Smoking Status Never   BSA 2.25 m²       Gynecologic History    Patient's last menstrual period was 2024.  Gardasil Vaccine Series: received one dose 7/3/23.   Last Pap: 7/3/23. Results were: normal    Obstetric and Medical History    OB History    Para Term  AB Living   0 0 0 0 0 0   SAB IAB Ectopic Multiple Live Births   0 0 0 0 0       Past Medical History:   Diagnosis Date    Allergic ?    I meant to ask about food allergy testing. Possible egg allergy.    Fibrous dysplasia (monostotic), left forearm     \"3 spots on the bones\"    HL (hearing loss) X    Technically not loss. Concerned i have issues processing hearing.    Obesity Currently.    Varicella     As a child       No past surgical history on file.    The following portions of the patient's history were reviewed and updated as appropriate: allergies, current medications, past family history, past medical history, past social history, past surgical history, and problem list.    Review of Systems    Pertinent items are noted in HPI.      Objective    Physical Exam  Constitutional:       Appearance: Normal appearance. She is well-developed.   Genitourinary:      Vulva, bladder and urethral meatus normal.      No lesions in the vagina.      Right Labia: No rash, tenderness, lesions, skin " changes or Bartholin's cyst.     Left Labia: No tenderness, lesions, skin changes, Bartholin's cyst or rash.     No labial fusion noted.      No inguinal adenopathy present in the right or left side.     No vaginal discharge, erythema, tenderness, bleeding or granulation tissue.      No vaginal prolapse present.     No vaginal atrophy present.       Right Adnexa: not tender, not full and no mass present.     Left Adnexa: not tender, not full and no mass present.     Cervix is nulliparous.      No cervical motion tenderness, discharge, friability, lesion, polyp or nabothian cyst.      Uterus is not enlarged, tender, irregular or prolapsed.      No uterine mass detected.     Uterus is anteverted.      Pelvic exam was performed with patient in the lithotomy position.   Breasts:     Breasts are symmetrical.      Right: No inverted nipple, mass, nipple discharge, skin change or tenderness.      Left: No inverted nipple, mass, nipple discharge, skin change or tenderness.   HENT:      Head: Normocephalic and atraumatic.   Neck:      Thyroid: No thyromegaly.   Cardiovascular:      Rate and Rhythm: Normal rate and regular rhythm.      Heart sounds: Normal heart sounds, S1 normal and S2 normal.   Pulmonary:      Effort: Pulmonary effort is normal.      Breath sounds: Normal breath sounds.   Abdominal:      General: Bowel sounds are normal. There is no distension.      Palpations: Abdomen is soft. There is no mass.      Tenderness: There is no abdominal tenderness. There is no guarding.      Hernia: There is no hernia in the left inguinal area or right inguinal area.   Musculoskeletal:      Cervical back: Neck supple.   Lymphadenopathy:      Cervical: No cervical adenopathy.      Upper Body:      Right upper body: No supraclavicular or axillary adenopathy.      Left upper body: No supraclavicular or axillary adenopathy.      Lower Body: No right inguinal adenopathy. No left inguinal adenopathy.   Neurological:      Mental  Status: She is alert.   Skin:     General: Skin is warm and dry.      Findings: No rash.   Psychiatric:         Attention and Perception: Attention and perception normal.         Mood and Affect: Mood and affect normal.         Speech: Speech normal.         Behavior: Behavior is cooperative.         Thought Content: Thought content normal.         Cognition and Memory: Cognition and memory normal.         Judgment: Judgment normal.   Vitals and nursing note reviewed.          Assessment and Plan    Kalpana was seen today for gynecologic exam.    Diagnoses and all orders for this visit:    Women's annual routine gynecological examination    Need for HPV vaccination  -     HPV Vaccine 9 valent IM      Patient informed of a Stable GYN exam. A pap smear was not performed due to a normal pap in 2023.     I have discussed the importance of exercise and healthy diet as well as adequate intake of calcium and vitamin D. The current ASCCP guidelines were reviewed. The low risk patient will receive pap smear screening every 3 years until the age of 29 and then every 3 to 5 years with HPV co-testing from the ages of 30-65. I emphasized the importance of an annual pelvic and breast exam. A yearly mammogram is recommended for breast cancer screening starting at age 40.     Results will be released to [facility], if abnormal will call to review and discuss treatment plan.     All questions have been answered to her satisfaction.       Contraception: condoms.  Follow up in: 1 year for annual exam, in 4 months for last gardasil vaccine - sooner if needed.

## 2024-08-28 NOTE — ASSESSMENT & PLAN NOTE
Reviewed diet recall with patient and suggested she meet with the dietitian to help with incorporating more protein and fiber rich foods throughout the day with meals and snacks.  Encouraged her to start tracking her food using my fitness pal to provide a baseline intake.  Patient requests virtual visit as her  has to drive her here from [employer].  She reports anxiety with driving.  Continue weighted hula hoop exercises 30 minutes a day.  Suggested 2 to 3 days of strength training with resistance bands and YouTube videos. STOP- BANG- 3/8 ;8  hours of sleep per night.  Reviewed antiobesity medications and given her weight loss goals and class III obesity BMI of 42 patient would like to try injectable medication.  Prescription for Wegovy submitted.  Oral medication options were discussed as well.  Patient has a questionable diagnosis of PCOS but was later told by her providers that she may not have the condition.  However due to potentially treating insulin resistance, we decided to trial metformin.  Phentermine is a stimulant and controlled substance so would likely consider this as a later option as patient has some baseline anxiety.  No contraindications to Topamax Wellbutrin naltrexone.  Discussed that all antiobesity medications are contraindicated in pregnancy and to incorporate contraception while on these agents.  Patient verbalized understanding and  present at the visit.  Will sign medication consent next visit.

## 2024-08-28 NOTE — PROGRESS NOTES
Assessment/Plan     Kalpana Reeves is 34 y.o. year old female  who comes in for consultation for assistance with weight management.     - Discussed options of HealthyCORE-Intensive Lifestyle Intervention Program, Very Low Calorie Diet-VLCD, and Conservative Program and the role of weight loss medications.  - Patient is interested in pursuing Conservative Program    Class 3 severe obesity due to excess calories with serious comorbidity and body mass index (BMI) of 40.0 to 44.9 in adult ([facility])  Reviewed diet recall with patient and suggested she meet with the dietitian to help with incorporating more protein and fiber rich foods throughout the day with meals and snacks.  Encouraged her to start tracking her food using [employer] pal to provide a baseline intake.  Patient requests virtual visit as her  has to drive her here from [place].  She reports anxiety with driving.  Continue weighted hula hoop exercises 30 minutes a day.  Suggested 2 to 3 days of strength training with resistance bands and YouTube videos. STOP- BANG- 3/8 ;8  hours of sleep per night.  Reviewed antiobesity medications and given her weight loss goals and class III obesity BMI of 42 patient would like to try injectable medication.  Prescription for Wegovy submitted.  Oral medication options were discussed as well.  Patient has a questionable diagnosis of PCOS but was later told by her providers that she may not have the condition.  However due to potentially treating insulin resistance, we decided to trial metformin.  Phentermine is a stimulant and controlled substance so would likely consider this as a later option as patient has some baseline anxiety.  No contraindications to Topamax Wellbutrin naltrexone.  Discussed that all antiobesity medications are contraindicated in pregnancy and to incorporate contraception while on these agents.  Patient verbalized understanding and  present at the visit.  Will sign medication consent  next visit.    Kalpana was seen today for consult.    Diagnoses and all orders for this visit:    Class 3 severe obesity due to excess calories with serious comorbidity and body mass index (BMI) of 40.0 to 44.9 in adult ([facility])  -     Semaglutide-Weight Management (WEGOVY) 0.25 MG/0.5ML; Inject 0.5 mL (0.25 mg total) under the skin once a week  -     metFORMIN (GLUCOPHAGE-XR) 500 mg 24 hr tablet; Start with 500 mg orally once daily with the evening meal increase in two weeks to 1000 mg (two tablets) if tolerated    Morbid obesity with BMI of 40.0-44.9, adult ([facility])  -     Ambulatory Referral to Weight Management      Metformin instructions     cautions: Nausea and diarrhea are the most common side effects which may improve in 1 to 2 weeks. Hypoglycemia may occur with insufficient calorie intake, strenuous exercise, or concomitant use of hypoglycemic agents or ethanol; increased risk in elderly.   Low vitamin B12 levels may occur; monitoring recommended.  Recommended supplementation with a good-quality over-the-counter B complex vitamin.  B12 levels will be checked yearly and additional supplementation recommended if needed.    Adverse effects   Cobalamin deficiency (7% to 17.4% )  Gastrointestinal: Diarrhea (53% (immediate-release) ; 10% to 13% (extended-release) ), Flatulence , Indigestion, Malabsorption syndrome, Nausea (7% (extended-release) ), Vomiting (Up to 25.5% )     Wegovy Instructions:    - Begin Wegovy 0.25 mg subcutaneously once a week. Dose changes may occur after 4 doses if medication is tolerated. You will be assessed prior to each dose change to make sure you are tolerating the medication well.  - Please message me when you have 2 pens left from the prescription so there are no lapses in treatment.  - Visit wegovy.com for further information/injection instructions.   -Please eat small frequent, low fat meals to help reduce nausea. Lemon water and saltine crackers may help with this. Avoid fried  foods  - Try to stop eating before you feel full  - Side effects of Wegovy discussed: nausea, vomiting, diarrhea, and constipation. If you experience fever, nausea/vomiting, and pain radiating to your back this may be a sign of pancreatitis. Please go to the emergency room if this occurs.  -- Take precautions to avoid dehydration. Aim for 64-80 oz of fluids/day  - A bowel regimen including OTC stool softeners, fiber supplements to manage constipation is suggested  - Patient understands the side effects of the medication and proper administration. Patient agrees with the treatment plan and all questions were answered.    The most common side effects of Wegovy include nausea, diarrhea, vomiting, constipation, abdominal (stomach) pain, headache, fatigue, dyspepsia (indigestion), dizziness, abdominal distension, eructation (belching), hypoglycemia (low blood sugar) in patients with type 2 diabetes treated with other drugs,  flatulence (gas buildup), gastroenteritis (an intestinal infection) and gastroesophageal reflux disease (a type of digestive disorder).     Wegovy should not be used in patients with a personal or family history of medullary thyroid carcinoma or in patients with a rare condition called Multiple Endocrine Neoplasia syndrome type 2 (MEN 2).     Wegovy also contains warnings for inflammation of the pancreas (pancreatitis), gallbladder problems (including gallstones), low blood sugar, acute kidney injury, diabetic retinopathy (damage to the eye's retina), increased heart rate and suicidal behavior or thinking .  Patient denies personal or ffamily Hx of pancreatitis, MEN 2 tumors and medullary thyroid cancer. Patient understands these major side effects and agrees to start the medication.    Subcutaneous injection:  Inject subQ into the thigh, abdomen, or upper arm; rotate injection sites.  Administer at any time of day, with or without meals.  Administer separately from insulin (do not mix the products);  "may inject both medications in the same body region but not adjacent to each other.  The day of the weekly administration can be changed as long as the time between the 2 doses is at least 3 days (72 hours)  Administer a missed dose as soon as possible within 4 days (96 hours) of missed dose; if more than 4 days have passed, skip the missed dose and administer next dose on regularly scheduled day and resume regular once weekly dosing schedule       -In addition, please follow general recommendations below.          Return visit:  6-8 weeks        General Lifestyle recommendations:    Nutrition   -Avoid skipping meals. Avoid sugary beverages. At least 64oz of water daily.  Limit processed food, refined sugars and grain. Encourage  healthy choices for meals and snacks   -Focus on protein goals and non starchy fiber rich vegetables for satiety effect and to help support a calorie deficit.   - Emphasize portion control, well balanced macronutrient's (protein, carbohydrate, fat using MyPlate method )and adequate protein with each meal/snacks and distributing calories equally throughout the day along with.   -Advise starting the day with a protein breakfast   Behavioral/Stress   Food log via kellie or provided paper log (kellie options include www.VirtueBuild.com, sparkpeople.com, loseit.com, calorieking.com, RagingWire). Encouraged mindful eating. Be sure to set aside time to eat, eat slowly, and savor your food. Consider meditation apps and/or taking a few minutes of mindfulness every AM. Understand the role of regarding the role of stress hormone cortisol in promoting weight gain and visceral fat accumulation. Weigh daily or atleast 2-3 times/ week  Physical Activity   Increase physical activity by 10 minutes daily. Gradually increase physical activity to a goal of 5 days per week for 30 minutes of MODERATE intensity ( should be able to pass the \"talk test\" but should not be able to sing. Target 150-300 minutes  PLUS 2 days " per week of FULL BODY resistance training. Progression will be addressed at follow up visits. Encouraged contemplation regarding establishing a daily physical activity routine  - Resistance training along with increase protein intake is important to maintain and enhance metabolism  Sleep   Encourage sleep hygiene and importance of having adequate sleep duration at least > 6 hours to support response in weight loss efforts    Handouts provided :  THRIVE program at [facility]  MyPlate and food quality  Food log resources, phone kellie or paper journal  Antiobesity medications options     - Discussed at length and the role of weight loss medications and medication options   - Explained the importance of making lifestyle changes in addition to starting any anti-obesity medications if the  patient chooses.  -  Initial weight loss goal of 5-10% weight loss for improved health  - Weight loss can improve patient's co-morbid conditions and/or prevent weight-related complications.  - Weight is not at goal and patient has been unable to achieve a meaningful weight loss above 5% using various programs and tools for more than 6 months    Kalpana was seen today for consult.    Diagnoses and all orders for this visit:    Class 3 severe obesity due to excess calories with serious comorbidity and body mass index (BMI) of 40.0 to 44.9 in adult ([facility])  -     Semaglutide-Weight Management (WEGOVY) 0.25 MG/0.5ML; Inject 0.5 mL (0.25 mg total) under the skin once a week  -     metFORMIN (GLUCOPHAGE-XR) 500 mg 24 hr tablet; Start with 500 mg orally once daily with the evening meal increase in two weeks to 1000 mg (two tablets) if tolerated    Morbid obesity with BMI of 40.0-44.9, adult ([facility])  -     Ambulatory Referral to Weight Management                      Total time spent reviewing chart, interviewing patient, examining patient, discussing plan, answering all questions, and documentin min, with >50% face-to-face time spent  counseling patient on nonsurgical interventions for the treatment of excess weight. Discussed in detail nonsurgical options including intensive lifestyle intervention program, very low-calorie diet program and conservative program.  Discussed the role of weight loss medications.  Counseled patient on diet behavior and exercise modification for weight loss.            Lifestyle questionnaire       Diet recall:  B: Overnight oats now yogurt and 2 pieces of toast with butter  S:no  L: sandwich with meat  S:no  D: bigger P/V/S  S: chocolate  Frequency Eating out x/ week: 1x/ week and not every week    Beverages  Water-- 48 oz   Caffeine/tea--  none once a week SSB -- oz juice once every other week    Alcohol: no drinks/ week   Smoking: no  Drug use: no    Physical Activity --weighted hula hoop 30 mins not for the severe anxiety     Sleep -- STOP- BANG- 3/8 ;8  hours of sleep per night issues with falling asleep    Occupation-seamtress    Psycho social- lives with      Gyneac (Menopausal status/periods/contraception)-none        Start date: 8/28/2024  Intial weight : 262 lbs  Intial BMI: 42 kg/m2  Obesity Class: Above 40- Obesity Class III  Goal weight: 180 lbs        History of present illness       Onset--always struggled with her weight  increase in weight over the years. 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  Fam hx of obesity-whole family    Food behaviors-liz to avoid snacks and not keep it at the house    Previous Weight loss medications/Weight loss attempts:   Leaner foods and incorporating exercise over the last 6 months   Patient reports and other history-    Referred by PCP  Wt Readings from Last 30 Encounters:   08/28/24 119 kg (262 lb 12.8 oz)   08/28/24 121 kg (266 lb)   06/10/24 117 kg (258 lb)   05/14/24 117 kg (258 lb)   05/09/24 117 kg (258 lb)   03/18/24 117 kg (257 lb)   02/21/24 114 kg (252 lb)  "  02/20/24 115 kg (253 lb)   07/03/23 113 kg (250 lb)   05/08/23 112 kg (247 lb)   05/02/23 110 kg (242 lb)           Medication considerations/contraindications     -Patient denies personal history of pancreatitis. Patient also denies personal and family history of medullary thyroid cancer, and multiple endocrine neoplasia type 2 (MEN 2 tumor). -Patient denies any history of kidney stones, seizures, or glaucoma, diabetic retinopathy, gall bladder disease, gastroparesis, hyperthyroidism.  -Denies Hx of CAD, PAD, palpitations, arrhythmia, uncontrolled hypertension  -Denies uncontrolled anxiety or depression, suicidal behavior or thinking , insomnia or sleep disturbance.         Past medical history/past surgical history       Previous notes and records have been reviewed.    The following portions of the patient's history were reviewed and updated as appropriate: allergies, current medications, past family history, past medical history, past social history, past surgical history, and problem list.    Past Medical History:   Diagnosis Date    Allergic ?    I meant to ask about food allergy testing. Possible egg allergy.    Fibrous dysplasia (monostotic), left forearm     \"3 spots on the bones\"    HL (hearing loss) X    Technically not loss. Concerned i have issues processing hearing.    Obesity Currently.    Varicella     As a child         History reviewed. No pertinent surgical history.          Family History   Problem Relation Age of Onset    Lupus Mother     Diabetes Mother         Possobly brought on by steroids.    Sarcoidosis Mother     Mental illness Mother     Depression Mother         Her and I believe her mother.    Autoimmune disease Mother         Lupus, sarcoidosis.    Anxiety disorder Mother     Bipolar disorder Mother     OCD Mother     Hypertension Father     Alcohol abuse Father         His biological father.    Mental illness Father         PTSD from serving in army.    Breast cancer Maternal " "Grandmother 50        Twice, as well as both her sisters.    Cancer Maternal Grandmother         Several of her family with varying forms.    Arthritis Maternal Grandmother     Breast cancer additional onset Maternal Grandmother     Bipolar disorder Maternal Grandmother     COPD Maternal Grandfather         From smoking.    Anxiety disorder Maternal Uncle             Objective     /80   Pulse 100   Ht 5' 6.5\" (1.689 m)   Wt 119 kg (262 lb 12.8 oz)   LMP 08/07/2024 (Exact Date)   BMI 41.78 kg/m²       Review of Systems    Physical Exam       Medications       Current Outpatient Medications:     metFORMIN (GLUCOPHAGE-XR) 500 mg 24 hr tablet, Start with 500 mg orally once daily with the evening meal increase in two weeks to 1000 mg (two tablets) if tolerated, Disp: 60 tablet, Rfl: 3    Semaglutide-Weight Management (WEGOVY) 0.25 MG/0.5ML, Inject 0.5 mL (0.25 mg total) under the skin once a week, Disp: 2 mL, Rfl: 0           Labs and imaging     Recent labs and imaging have been personally reviewed.  Lab Results   Component Value Date    WBC 8.65 02/21/2024    HGB 13.6 02/21/2024    HCT 39.8 02/21/2024    MCV 88 02/21/2024     02/21/2024     Lab Results   Component Value Date    SODIUM 139 05/10/2024    K 4.1 05/10/2024     05/10/2024    CO2 27 05/10/2024    AGAP 9 02/21/2024    BUN 11 05/10/2024    CREATININE 0.79 05/10/2024    GLUC 84 05/10/2024    CALCIUM 9.3 05/10/2024    AST 15 05/10/2024    ALT 18 05/10/2024    ALKPHOS 97 05/10/2024    TP 7.7 05/10/2024    TBILI 0.4 05/10/2024    EGFR 102 05/10/2024     Lab Results   Component Value Date    HGBA1C 5.9 (H) 05/10/2024     Lab Results   Component Value Date    TSH 3.03 05/10/2024     Lab Results   Component Value Date    CHOLESTEROL 151 05/10/2024     Lab Results   Component Value Date    HDL 47 (L) 05/10/2024     Lab Results   Component Value Date    TRIG 118 05/10/2024     Lab Results   Component Value Date    LDLCALC 83 05/10/2024 "

## 2024-09-05 ENCOUNTER — TELEPHONE (OUTPATIENT)
Dept: FAMILY MEDICINE CLINIC | Facility: CLINIC | Age: 34
End: 2024-09-05

## 2024-09-25 ENCOUNTER — CLINICAL SUPPORT (OUTPATIENT)
Dept: BARIATRICS | Facility: CLINIC | Age: 34
End: 2024-09-25

## 2024-09-25 VITALS — WEIGHT: 260 LBS | BODY MASS INDEX: 40.81 KG/M2 | HEIGHT: 67 IN

## 2024-09-25 DIAGNOSIS — E66.01 CLASS 3 SEVERE OBESITY DUE TO EXCESS CALORIES WITH SERIOUS COMORBIDITY AND BODY MASS INDEX (BMI) OF 40.0 TO 44.9 IN ADULT (HCC): ICD-10-CM

## 2024-09-25 DIAGNOSIS — R63.5 ABNORMAL WEIGHT GAIN: Primary | ICD-10-CM

## 2024-09-25 DIAGNOSIS — E66.813 CLASS 3 SEVERE OBESITY DUE TO EXCESS CALORIES WITH SERIOUS COMORBIDITY AND BODY MASS INDEX (BMI) OF 40.0 TO 44.9 IN ADULT (HCC): ICD-10-CM

## 2024-09-25 PROCEDURE — RECHECK

## 2024-09-25 PROCEDURE — WMDI30

## 2024-09-25 RX ORDER — METFORMIN HCL 500 MG
TABLET, EXTENDED RELEASE 24 HR ORAL
Qty: 60 TABLET | Refills: 3 | Status: SHIPPED | OUTPATIENT
Start: 2024-09-25

## 2024-09-25 NOTE — PROGRESS NOTES
" Patient's name and  were verified during visit. Pt present in a state that I hold active licensure.   Provider explained that [employer] is a HIPPA compliant platform and to further protect their confidentiality the provider was alone and the office door was closed. Patient consented to the virtual video visit Patient consented to the [employer] visit.    Weight Management Medical Nutrition Assessment-VIRTUAL  Pt is here today for a virtual menu planning visit. Current weight 260# (reported wt). Started to increase weight when moved from [place], [place] to here in  at which time she changed jobs from a more active job to a more sedentary job.  She was ordered Metformin and wegovy, but it had to be sent to new pharmacy so will start once gets the rx.  Reviewed diet recall and pt is making some healthier food choices but likely higher in calories than expected.  She does also, at times, go long periods w/o eating. Suggested to set alarms if needed to ideally eat something every 3-4hrs. Emphasized the importance of eating and meeting protein needs once on the Wegovy.  Did some menu planning to meet both calorie and protein needs. Pt will assess how the changes go once she is on the Wegovy and f/u with RN PRN.     Patient seen by Medical Provider in past 6 months:  yes  Requested to schedule appointment with Medical Provider: No    Anthropometric Measurements  Provider Start Weight (#): 262.4# (24)  Current Weight (#): 260#--reported per pt  TBW % Change from start weight: -%  IBW: 132.5# (66.5\")  Goal Weight (#): lose 80#    Weight Loss History  Previous weight loss attempts: Exercise  High Protein/Low CHO diets (Atkins, [place], etc.)  Self Created Diets (Portion Control, Healthy Food Choices, etc.)  Keto    Food and Nutrition Related History  Wake up: 9am out of bed   Bed Time: 11pm-12am    Dietary Recall  Breakfast: 2 slices of toast + fruit + greek or non-greek yougrt  Does 30 mins of weighed hula " mireya  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  Last night was salad with [employer]yer chicken tenders and ranch OR using the hello fresh recipes.  Snack: last night had an ice pop, but not usually   Bed:  11pm-12am  Weakness: bread    Beverages: water, very occ juice, occ sweet tea and coffee, but not on a regular  Volume of beverage intake: 32oz give and take    Weekends: breakfast gets pushed back and pancake, biscuits, turkey reeder, fruit  Cravings: when getting her menstrual   Trouble area of day: -    Frequency of Eating out: [employer] 1 large slice of pizza OR burger from there OR [employer] small burger and fries  Food restrictions: allergic to eggs, intolerant to bananas, right now touch hard foods because of teeth issues.  Cooking: pt  Food Shopping: pt    Food dislikes: pork, fish, spicy food    Occupation: seamstress     Physical Activity  Activity: 30 mins of the weighed oriana gomes that started at last visit, wants to get on the exercise bike  Frequency: 3-4 days per week  Physical limitations/barriers to exercise: -    Estimated Needs  Oleg Meza Energy Needs (needs at 262#)  BMR: 1923 kcal  Maintenance calories (sedentary): 2308 kcal  1-2#/week loss (sedentary): 0304-6177 kcal  1-2#/week loss (light activity): 3353-0406 kcal    Protein: 72-90 grams (1.2-1.5g/kg IBW)  Fluid: 2107 ml (35mL/kg IBW)    Nutrition Diagnosis  Yes;    Overweight/obesity  related to Excess energy intake as evidenced by  BMI more than normative standard for age and sex (obesity-grade III 40+)     Nutrition Intervention    Nutrition Prescription  Calories: 3086-5788  kcal  Protein: 80-90 g  Fluid: 2100 ml    Meal Plan (Florencio/Pro)  I would suggested between 6376-1909 calories, 80-90gm of protein, and 64oz fluids per day.      Here is a suggested meal plan:  [id number] cals:     290 florencio pro oatmeal + 1 cup of fat free milk=390cals, 32gm protein  2 slices of 647 toast + 2 fruit servings + 1 greek [id number]=[id number], 12gm protein  1 Premier protein shake + 1-2 fruit servings = 220-280cals, 30g    L-12:30pm- 400-450cals:    3oz deli turkey/chicken + 1 oz cheese + 2 slices of 647 bread + 2 tbsp hummus= 370cals, 28gm protein  4oz lean protein +1 cup veggies + ½ cup starch (120cals) + 100 cals extra = 430cals, 28gm protein    S-3:30pm:  200 florencio--[employer] Crunch protein bar OR see list    D: 6:30pm-500cals:  4oz lean protein + 2 starch servings + 1.5 cups veggies + 100 florencio “extra” =500cals, 28gm protein      Nutrition Education  Calorie controlled menu  Lean protein food choices  Healthy snack options  Food journaling tips    Nutrition Counseling  Strategies: meal planning, portion sizes, healthy snack choices, hydration, fiber intake, protein intake, exercise, food logging    Monitoring and Evaluation:    Evaluation criteria  Energy Intake  Meet protein needs  Maintain adequate hydration  Monitor weekly weight  Meal planning/preparation  Food journal   Decreased portions at mealtimes and snacks  Physical activity     Barriers to change:none  Readiness to change: Preparation:  (Getting ready to change)   Comprehension: good  Expected Compliance: good

## 2024-10-01 ENCOUNTER — TELEPHONE (OUTPATIENT)
Dept: BARIATRICS | Facility: CLINIC | Age: 34
End: 2024-10-01

## 2024-11-08 DIAGNOSIS — E66.01 CLASS 3 SEVERE OBESITY DUE TO EXCESS CALORIES WITH SERIOUS COMORBIDITY AND BODY MASS INDEX (BMI) OF 40.0 TO 44.9 IN ADULT (HCC): ICD-10-CM

## 2024-11-08 DIAGNOSIS — E66.813 CLASS 3 SEVERE OBESITY DUE TO EXCESS CALORIES WITH SERIOUS COMORBIDITY AND BODY MASS INDEX (BMI) OF 40.0 TO 44.9 IN ADULT (HCC): ICD-10-CM

## 2024-11-09 DIAGNOSIS — E66.01 OBESITY, CLASS III, BMI 40-49.9 (MORBID OBESITY) (HCC): Primary | ICD-10-CM

## 2024-11-19 ENCOUNTER — CLINICAL SUPPORT (OUTPATIENT)
Dept: OBGYN CLINIC | Facility: CLINIC | Age: 34
End: 2024-11-19
Payer: COMMERCIAL

## 2024-11-19 ENCOUNTER — OFFICE VISIT (OUTPATIENT)
Dept: BARIATRICS | Facility: CLINIC | Age: 34
End: 2024-11-19
Payer: COMMERCIAL

## 2024-11-19 VITALS
WEIGHT: 241 LBS | DIASTOLIC BLOOD PRESSURE: 78 MMHG | SYSTOLIC BLOOD PRESSURE: 118 MMHG | BODY MASS INDEX: 37.83 KG/M2 | OXYGEN SATURATION: 97 % | HEART RATE: 80 BPM | RESPIRATION RATE: 18 BRPM | HEIGHT: 67 IN

## 2024-11-19 DIAGNOSIS — E66.01 CLASS 3 SEVERE OBESITY DUE TO EXCESS CALORIES WITH SERIOUS COMORBIDITY AND BODY MASS INDEX (BMI) OF 40.0 TO 44.9 IN ADULT (HCC): ICD-10-CM

## 2024-11-19 DIAGNOSIS — Z23 NEED FOR HPV VACCINATION: Primary | ICD-10-CM

## 2024-11-19 DIAGNOSIS — E66.813 CLASS 3 SEVERE OBESITY DUE TO EXCESS CALORIES WITH SERIOUS COMORBIDITY AND BODY MASS INDEX (BMI) OF 40.0 TO 44.9 IN ADULT (HCC): ICD-10-CM

## 2024-11-19 DIAGNOSIS — E66.812 OBESITY, CLASS II, BMI 35-39.9: Primary | ICD-10-CM

## 2024-11-19 PROCEDURE — 99215 OFFICE O/P EST HI 40 MIN: CPT | Performed by: INTERNAL MEDICINE

## 2024-11-19 PROCEDURE — 90651 9VHPV VACCINE 2/3 DOSE IM: CPT

## 2024-11-19 PROCEDURE — 90471 IMMUNIZATION ADMIN: CPT

## 2024-11-19 RX ORDER — METFORMIN HYDROCHLORIDE 500 MG/1
TABLET, EXTENDED RELEASE ORAL
Qty: 180 TABLET | Refills: 3 | Status: SHIPPED | OUTPATIENT
Start: 2024-11-19

## 2024-11-19 RX ORDER — SEMAGLUTIDE 0.25 MG/.5ML
INJECTION, SOLUTION SUBCUTANEOUS
COMMUNITY
Start: 2024-11-09

## 2024-11-19 NOTE — PROGRESS NOTES
"  Program: Conservative Program    Assessment/Plan     Kalpana Reeves  is a 34 y.o. female with  returns to follow up  for treatment of excess body weight and associated risk factors/co-morbidities.     Class 3 severe obesity due to excess calories with serious comorbidity and body mass index (BMI) of 40.0 to 44.9 in adult (HCC)  Patient is currently on Wegovy 0.25 mg and has finished 5 doses.  Congratulated patient.    she has lost 21 pounds-roughly 4 pounds per week.  We discussed staying on the same dose for an additional month and then transitioning to 0.5 mg.  Patient agreeable.  She will also continue metformin at 500 twice a day.  90-day supply provided.  Patient will continue to log using [employer].  She has met with a nutritionist and has been provided with the following  Nutrition Prescription  Calories: 4382-7743 kcal  Protein: 80-90 g  Fluid: 2100 ml  She has started incorporating 20 to 30 minutes on the exercise bike and is also doing some light weights.  Encouraged her to progress to resistance bands or show progression in her weight training.  She reports that her  [facility]  STOP- BANG- 3/8 ;8  hours of sleep       Most recent notes and records were reviewed.         Return visit:  2-3 months     Nutrition   Do not skip meals. Avoid sugary beverages. At least 64oz of water daily.    Behavioral/Stress   Food log via kellie or provided paper log (kellie options include www.[employer].com, sparkpeople.com, loseit.com, calorieking.com, [employer]). Encouraged mindful eating    Physical Activity  Increase physical activity by 10 minutes daily. Gradually increase physical activity to a goal of 5 days per week for 30 minutes of MODERATE intensity ( ( should be able to pass the \"talk test\" but should not be able to sing.  target 150-300 minutes  PLUS 2-3 days per week of FULL BODY resistance training. Progression will be addressed at follow up visits. " Encouraged planning regarding establishing physical activity routine    Sleep  Provided sleep hygiene counseling and importance of having adequate sleep duration          Kalpana was seen today for follow-up.    Diagnoses and all orders for this visit:    Obesity, Class II, BMI 35-39.9  -     Discontinue: Semaglutide-Weight Management (WEGOVY) 0.25 MG/0.5ML; Inject 0.5 mL (0.25 mg total) under the skin once a week  -     Semaglutide-Weight Management (WEGOVY) 0.25 MG/0.5ML; Inject 0.5 mL (0.25 mg total) under the skin once a week    Class 3 severe obesity due to excess calories with serious comorbidity and body mass index (BMI) of 40.0 to 44.9 in adult (HCC)  -     metFORMIN (GLUCOPHAGE-XR) 500 mg 24 hr tablet; Start with 500 mg orally once daily with the evening meal increase in two weeks to 1000 mg (two tablets) if tolerated                Total time spent reviewing chart, interviewing patient, examining patient, discussing plan, answering all questions, and documentin minutes with >50% face-to-face time with the patient.            Weight trajectory     Wt Readings from Last 20 Encounters:   24 109 kg (241 lb)   24 118 kg (260 lb)   24 119 kg (262 lb 12.8 oz)   24 121 kg (266 lb)   06/10/24 117 kg (258 lb)   24 117 kg (258 lb)   24 117 kg (258 lb)   24 117 kg (257 lb)   24 114 kg (252 lb)   24 115 kg (253 lb)   23 113 kg (250 lb)   23 112 kg (247 lb)   23 110 kg (242 lb)       2nd month of 0.2 5no SE   baritastic      Lifestyle questionnaire   Nutrition Prescription  Calories: 7095-4630 kcal  Protein: 80-90 g  Fluid: 2100 ml    Diet recall:  B:  yogurt and low-sodium turkey reeder   S:no  L: sandwich with meat  S:protein bar  D: bigger P/V/S  S: halo top + PS  Frequency Eating out x/ week: 1x/ week and not every week    Beverages  Water-- 48 oz   Caffeine/tea--  none once a week SSB -- oz juice once every other week    Alcohol: no  "drinks/ week   Smoking: no  Drug use: no    Physical Activity --20-30 mins on exercise bike weighted hula hoop 30 mins not for the severe anxiety     Sleep -- STOP- BANG- 3/8 ;8  hours of sleep per night issues with falling asleep    Occupation-seamtress    Psycho social- lives with      Gyneac (Menopausal status/periods/contraception)-none        Start date: 8/28/2024  Intial weight : 262 lbs  Intial BMI: 42 kg/m2  Obesity Class: Above 40- Obesity Class III  Goal weight: 180 lbs      Weight on 11/19/24: 241  lbs  BMI on 11/19/24: 38.3 kg/m2 35.0-39.9- Obesity Class II  Difference: -21 lbs      Anti obesity Medications/ Medical---    Weight loss medication and dose: Wegovy  Date initiated: Oct 22, 2024               Objective         The following portions of the patient's history were reviewed and updated as appropriate: allergies, current medications, past family history, past medical history, past social history, past surgical history, and problem list.      /78 (BP Location: Right arm, Patient Position: Sitting, Cuff Size: Adult)   Pulse 80   Resp 18   Ht 5' 6.5\" (1.689 m)   Wt 109 kg (241 lb)   SpO2 97%   BMI 38.32 kg/m²             Review of Systems   Constitutional:  Negative for fatigue.   HENT:  Negative for sore throat.    Respiratory:  Negative for cough and shortness of breath.    Cardiovascular:  Negative for chest pain, palpitations and leg swelling.   Gastrointestinal:  Negative for abdominal pain, constipation, diarrhea and nausea.   Genitourinary:  Negative for dysuria.   Musculoskeletal:  Negative for arthralgias and back pain.   Skin:  Negative for rash.   Neurological:  Negative for headaches.   Psychiatric/Behavioral:  Negative for dysphoric mood. The patient is not nervous/anxious.          Physical Exam  Vitals and nursing note reviewed.   Constitutional:       Appearance: Normal appearance.   HENT:      Head: Normocephalic.   Pulmonary:      Effort: Pulmonary effort is " normal.   Neurological:      General: No focal deficit present.      Mental Status: She is alert and oriented to person, place, and time.   Psychiatric:         Mood and Affect: Mood normal.         Behavior: Behavior normal.         Thought Content: Thought content normal.         Judgment: Judgment normal.          Current medications       Current Outpatient Medications:     metFORMIN (GLUCOPHAGE-XR) 500 mg 24 hr tablet, Start with 500 mg orally once daily with the evening meal increase in two weeks to 1000 mg (two tablets) if tolerated, Disp: 180 tablet, Rfl: 3    Semaglutide-Weight Management (WEGOVY) 0.25 MG/0.5ML, Inject 0.5 mL (0.25 mg total) under the skin once a week, Disp: 2 mL, Rfl: 0    Wegovy 0.25 MG/0.5ML, , Disp: , Rfl:     Semaglutide-Weight Management (WEGOVY) 0.5 MG/0.5ML, Inject 0.5 mL (0.5 mg total) under the skin once a week (Patient not taking: Reported on 11/19/2024), Disp: 2 mL, Rfl: 0         Medication considerations/contraindications     -Patient denies personal history of pancreatitis. Patient also denies personal and family history of medullary thyroid cancer, and multiple endocrine neoplasia type 2 (MEN 2 tumor). -Patient denies any history of kidney stones, seizures, or glaucoma, diabetic retinopathy, gall bladder disease, gastroparesis, hyperthyroidism.  -Denies Hx of CAD, PAD, palpitations, arrhythmia, uncontrolled hypertension  -Denies uncontrolled anxiety or depression, suicidal behavior or thinking , insomnia or sleep disturbance.         Labs     Most recent labs reviewed   Lab Results   Component Value Date    SODIUM 139 05/10/2024    K 4.1 05/10/2024     05/10/2024    CO2 27 05/10/2024    AGAP 9 02/21/2024    BUN 11 05/10/2024    CREATININE 0.79 05/10/2024    GLUC 84 05/10/2024    CALCIUM 9.3 05/10/2024    AST 15 05/10/2024    ALT 18 05/10/2024    ALKPHOS 97 05/10/2024    TP 7.7 05/10/2024    TBILI 0.4 05/10/2024    EGFR 102 05/10/2024     Lab Results   Component Value  "Date    HGBA1C 5.9 (H) 05/10/2024     Lab Results   Component Value Date    TSH 3.03 05/10/2024     Lab Results   Component Value Date    CHOLESTEROL 151 05/10/2024     Lab Results   Component Value Date    HDL 47 (L) 05/10/2024     Lab Results   Component Value Date    TRIG 118 05/10/2024     Lab Results   Component Value Date    LDLCALC 83 05/10/2024     No results found for: \"VITD\"  No components found for: \"FASTINS\"   "

## 2024-11-19 NOTE — ASSESSMENT & PLAN NOTE
Patient is currently on Wegovy 0.25 mg and has finished 5 doses.  Congratulated patient.    she has lost 21 pounds-roughly 4 pounds per week.  We discussed staying on the same dose for an additional month and then transitioning to 0.5 mg.  Patient agreeable.  She will also continue metformin at 500 twice a day.  90-day supply provided.  Patient will continue to log using [employer].  She has met with a nutritionist and has been provided with the following  Nutrition Prescription  Calories: 7521-9580 kcal  Protein: 80-90 g  Fluid: 2100 ml  She has started incorporating 20 to 30 minutes on the exercise bike and is also doing some light weights.  Encouraged her to progress to resistance bands or show progression in her weight training.  She reports that her  [facility]  STOP- BANG- 3/8 ;8  hours of sleep

## 2024-11-26 DIAGNOSIS — E66.813 CLASS 3 SEVERE OBESITY DUE TO EXCESS CALORIES WITH SERIOUS COMORBIDITY AND BODY MASS INDEX (BMI) OF 40.0 TO 44.9 IN ADULT (HCC): ICD-10-CM

## 2024-11-26 DIAGNOSIS — E66.01 CLASS 3 SEVERE OBESITY DUE TO EXCESS CALORIES WITH SERIOUS COMORBIDITY AND BODY MASS INDEX (BMI) OF 40.0 TO 44.9 IN ADULT (HCC): ICD-10-CM

## 2024-11-26 RX ORDER — METFORMIN HYDROCHLORIDE 500 MG/1
TABLET, EXTENDED RELEASE ORAL
Qty: 180 TABLET | Refills: 3 | Status: SHIPPED | OUTPATIENT
Start: 2024-11-26

## 2025-01-22 DIAGNOSIS — E66.01 OBESITY, CLASS III, BMI 40-49.9 (MORBID OBESITY) (HCC): ICD-10-CM

## 2025-01-24 DIAGNOSIS — E66.01 CLASS 3 SEVERE OBESITY DUE TO EXCESS CALORIES WITH SERIOUS COMORBIDITY AND BODY MASS INDEX (BMI) OF 40.0 TO 44.9 IN ADULT (HCC): Primary | ICD-10-CM

## 2025-01-24 DIAGNOSIS — E66.813 CLASS 3 SEVERE OBESITY DUE TO EXCESS CALORIES WITH SERIOUS COMORBIDITY AND BODY MASS INDEX (BMI) OF 40.0 TO 44.9 IN ADULT (HCC): Primary | ICD-10-CM

## 2025-02-12 DIAGNOSIS — E66.813 CLASS 3 SEVERE OBESITY DUE TO EXCESS CALORIES WITH SERIOUS COMORBIDITY AND BODY MASS INDEX (BMI) OF 40.0 TO 44.9 IN ADULT (HCC): ICD-10-CM

## 2025-02-12 DIAGNOSIS — E66.01 CLASS 3 SEVERE OBESITY DUE TO EXCESS CALORIES WITH SERIOUS COMORBIDITY AND BODY MASS INDEX (BMI) OF 40.0 TO 44.9 IN ADULT (HCC): ICD-10-CM

## 2025-02-13 ENCOUNTER — PATIENT MESSAGE (OUTPATIENT)
Dept: BARIATRICS | Facility: CLINIC | Age: 35
End: 2025-02-13

## 2025-02-13 DIAGNOSIS — E66.01 CLASS 3 SEVERE OBESITY DUE TO EXCESS CALORIES WITH SERIOUS COMORBIDITY AND BODY MASS INDEX (BMI) OF 40.0 TO 44.9 IN ADULT (HCC): ICD-10-CM

## 2025-02-13 DIAGNOSIS — E66.813 CLASS 3 SEVERE OBESITY DUE TO EXCESS CALORIES WITH SERIOUS COMORBIDITY AND BODY MASS INDEX (BMI) OF 40.0 TO 44.9 IN ADULT (HCC): ICD-10-CM

## 2025-02-13 RX ORDER — METFORMIN HYDROCHLORIDE 500 MG/1
TABLET, EXTENDED RELEASE ORAL
Qty: 180 TABLET | Refills: 3 | Status: SHIPPED | OUTPATIENT
Start: 2025-02-13 | End: 2025-02-19 | Stop reason: SDUPTHER

## 2025-02-13 RX ORDER — METFORMIN HYDROCHLORIDE 500 MG/1
TABLET, EXTENDED RELEASE ORAL
Qty: 180 TABLET | Refills: 0 | OUTPATIENT
Start: 2025-02-13

## 2025-02-19 ENCOUNTER — OFFICE VISIT (OUTPATIENT)
Dept: BARIATRICS | Facility: CLINIC | Age: 35
End: 2025-02-19
Payer: COMMERCIAL

## 2025-02-19 VITALS
DIASTOLIC BLOOD PRESSURE: 84 MMHG | HEART RATE: 84 BPM | WEIGHT: 224.2 LBS | SYSTOLIC BLOOD PRESSURE: 120 MMHG | HEIGHT: 67 IN | BODY MASS INDEX: 35.19 KG/M2 | OXYGEN SATURATION: 99 %

## 2025-02-19 DIAGNOSIS — E66.01 CLASS 3 SEVERE OBESITY DUE TO EXCESS CALORIES WITH SERIOUS COMORBIDITY AND BODY MASS INDEX (BMI) OF 40.0 TO 44.9 IN ADULT (HCC): Primary | ICD-10-CM

## 2025-02-19 DIAGNOSIS — E66.812 CLASS 2 SEVERE OBESITY DUE TO EXCESS CALORIES WITH SERIOUS COMORBIDITY AND BODY MASS INDEX (BMI) OF 35.0 TO 35.9 IN ADULT (HCC): ICD-10-CM

## 2025-02-19 DIAGNOSIS — E66.813 CLASS 3 SEVERE OBESITY DUE TO EXCESS CALORIES WITH SERIOUS COMORBIDITY AND BODY MASS INDEX (BMI) OF 40.0 TO 44.9 IN ADULT (HCC): Primary | ICD-10-CM

## 2025-02-19 DIAGNOSIS — E66.01 CLASS 2 SEVERE OBESITY DUE TO EXCESS CALORIES WITH SERIOUS COMORBIDITY AND BODY MASS INDEX (BMI) OF 35.0 TO 35.9 IN ADULT (HCC): ICD-10-CM

## 2025-02-19 PROCEDURE — 99215 OFFICE O/P EST HI 40 MIN: CPT | Performed by: INTERNAL MEDICINE

## 2025-02-19 RX ORDER — METFORMIN HYDROCHLORIDE 500 MG/1
TABLET, EXTENDED RELEASE ORAL
Qty: 180 TABLET | Refills: 3 | Status: SHIPPED | OUTPATIENT
Start: 2025-02-19

## 2025-02-19 NOTE — PROGRESS NOTES
Program: Conservative Program    Assessment/Plan     Kalpana Reeves  is a 34 y.o. female with  returns to follow up  for treatment of excess body weight and associated risk factors/co-morbidities.     Class 3 severe obesity due to excess calories with serious comorbidity and body mass index (BMI) of 40.0 to 44.9 in adult (HCC)  Antiobesity Medications/Medical --  Patient currently on 1 mg of Wegovy and is having some challenges with nausea.  She would like to stay on the same dose for an additional month.  Submitted prescription for 1 mg of Wegovy and will increase to 1.7 mg.  Submitted postdated prescription  She has not done really well with a 38 pound weight loss-15.7% since initiation  Oral medication options were discussed:   Phentermine is a stimulant and controlled substance so would likely consider this as a later option as patient has some baseline anxiety.  No contraindications to Topamax Wellbutrin naltrexone.    We will continue current dose of metformin 500 mg twice a day for PCOS      Nutrition:    Patient has been doing well following nutritional plan below and meeting her protein goals staying consistently in a calorie deficit  Nutrition Prescription  Calories: 3828-6469 kcal  Protein: 80-90 g  Fluid: 2100 ml    Physical Activity:   Patient has been consistent with her resistance training program using bands and has been progressing from the light to the medium bands  She is currently participating in 100 minutes of cardio per week-advised her to target 150 to 200 minutes by adding 10 to 15 minutes/day.    Sleep: -  Sleep improved STOP-BANG 3/8; 8 hours sleep    Food Behaviors/Stress:   She reports that her  is also [facility]. she has good support the simultaneously proceed with their weight loss journies  Congratulated her on tracking diligently using [employer]          Most recent notes and records were reviewed.         Return visit:  2-3 months  "    Nutrition   Do not skip meals. Avoid sugary beverages. At least 64oz of water daily.    Behavioral/Stress   Food log via kellie or provided paper log (kellie options include www.[employer].com, sparkpeople.com, loseit.com, calorieking.com, [employer]). Encouraged mindful eating    Physical Activity  Increase physical activity by 10 minutes daily. Gradually increase physical activity to a goal of 5 days per week for 30 minutes of MODERATE intensity ( ( should be able to pass the \"talk test\" but should not be able to sing.  target 150-300 minutes  PLUS 2-3 days per week of FULL BODY resistance training. Progression will be addressed at follow up visits. Encouraged planning regarding establishing physical activity routine    Sleep  Provided sleep hygiene counseling and importance of having adequate sleep duration          Kalpana was seen today for follow-up.    Diagnoses and all orders for this visit:    Class 3 severe obesity due to excess calories with serious comorbidity and body mass index (BMI) of 40.0 to 44.9 in adult ([facility])  -     Semaglutide-Weight Management (WEGOVY) 1 MG/0.5ML; Inject 0.5 mL (1 mg total) under the skin once a week  -     Semaglutide-Weight Management (WEGOVY) 1.7 MG/0.75ML; Inject 0.75 mL (1.7 mg total) under the skin once a week Do not start before 2025.  -     metFORMIN (GLUCOPHAGE-XR) 500 mg 24 hr tablet; Start with 500 mg orally once daily with the evening meal increase in two weeks to 1000 mg (two tablets) if tolerated    Class 2 severe obesity due to excess calories with serious comorbidity and body mass index (BMI) of 35.0 to 35.9 in adult ([facility])                  Total time spent reviewing chart, interviewing patient, examining patient, discussing plan, answering all questions, and documentin minutes with >50% face-to-face time with the patient.            Weight trajectory     Wt Readings from Last 20 Encounters:   25 102 kg (224 lb 3.2 oz)   24 109 kg (241 " lb)   09/25/24 118 kg (260 lb)   08/28/24 119 kg (262 lb 12.8 oz)   08/28/24 121 kg (266 lb)   06/10/24 117 kg (258 lb)   05/14/24 117 kg (258 lb)   05/09/24 117 kg (258 lb)   03/18/24 117 kg (257 lb)   02/21/24 114 kg (252 lb)   02/20/24 115 kg (253 lb)   07/03/23 113 kg (250 lb)   05/08/23 112 kg (247 lb)   05/02/23 110 kg (242 lb)         Lifestyle questionnaire   Nutrition Prescription  Calories: 4753-9201 kcal  Protein: 80-90 g  Fluid: 2100 ml    Diet recall:  B:  yogurt and low-sodium turkey reeder   S:no  L: sandwich with meat  S:protein bar  D: bigger P/V/S  S: halo top + PS  Frequency Eating out x/ week: 1x/ week and not every week    Beverages  Water-- 48 oz   Caffeine/tea--  none once a week SSB -- oz juice once every other week    Alcohol: no drinks/ week   Smoking: no  Drug use: no    Physical Activity --20-30 mins on exercise bike weighted hula hoop 30 mins not for the severe anxiety     Sleep -- STOP- BANG- 3/8 ;8  hours of sleep per night issues with falling asleep    Occupation-seamtress    Psycho social- lives with      Gyneac (Menopausal status/periods/contraception)-none        Start date: 8/28/2024  Intial weight : 262 lbs  Intial BMI: 42 kg/m2  Obesity Class: Above 40- Obesity Class III  Goal weight: 180 lbs      Weight on 11/19/24: 241  lbs  BMI on 11/19/24: 38.3 kg/m2 35.0-39.9- Obesity Class II  Difference: -21 lbs    Weight on 2/19/2025 :102 kg (224 lb 3.2 oz)(-17)  BMI on  2/19/2025 : Body mass index is 35.64 kg/m². 35.0-39.9- Obesity Class II  Difference: -38 lbs          Anti obesity Medications/ Medical---    Weight loss medication and dose: Wegovy  Date initiated: Oct 22, 2024               Objective         The following portions of the patient's history were reviewed and updated as appropriate: allergies, current medications, past family history, past medical history, past social history, past surgical history, and problem list.      /84 (BP Location: Left arm, Patient  "Position: Sitting, Cuff Size: Large)   Pulse 84   Ht 5' 6.5\" (1.689 m)   Wt 102 kg (224 lb 3.2 oz)   LMP 02/15/2025 (Exact Date)   SpO2 99%   BMI 35.64 kg/m²             Review of Systems   Constitutional:  Negative for fatigue.   HENT:  Negative for sore throat.    Respiratory:  Negative for cough and shortness of breath.    Cardiovascular:  Negative for chest pain, palpitations and leg swelling.   Gastrointestinal:  Negative for abdominal pain, constipation, diarrhea and nausea.   Genitourinary:  Negative for dysuria.   Musculoskeletal:  Negative for arthralgias and back pain.   Skin:  Negative for rash.   Neurological:  Negative for headaches.   Psychiatric/Behavioral:  Negative for dysphoric mood. The patient is not nervous/anxious.          Physical Exam  Vitals and nursing note reviewed.   Constitutional:       Appearance: Normal appearance.   HENT:      Head: Normocephalic.   Pulmonary:      Effort: Pulmonary effort is normal.   Neurological:      General: No focal deficit present.      Mental Status: She is alert and oriented to person, place, and time.   Psychiatric:         Mood and Affect: Mood normal.         Behavior: Behavior normal.         Thought Content: Thought content normal.         Judgment: Judgment normal.          Current medications       Current Outpatient Medications:     metFORMIN (GLUCOPHAGE-XR) 500 mg 24 hr tablet, Start with 500 mg orally once daily with the evening meal increase in two weeks to 1000 mg (two tablets) if tolerated, Disp: 180 tablet, Rfl: 3    Semaglutide-Weight Management (WEGOVY) 1 MG/0.5ML, Inject 0.5 mL (1 mg total) under the skin once a week, Disp: 2 mL, Rfl: 0    Semaglutide-Weight Management (WEGOVY) 1 MG/0.5ML, Inject 0.5 mL (1 mg total) under the skin once a week, Disp: 2 mL, Rfl: 0    [START ON 4/5/2025] Semaglutide-Weight Management (WEGOVY) 1.7 MG/0.75ML, Inject 0.75 mL (1.7 mg total) under the skin once a week Do not start before April 5, 2025., Disp: " "3 mL, Rfl: 2         Medication considerations/contraindications     -Patient denies personal history of pancreatitis. Patient also denies personal and family history of medullary thyroid cancer, and multiple endocrine neoplasia type 2 (MEN 2 tumor). -Patient denies any history of kidney stones, seizures, or glaucoma, diabetic retinopathy, gall bladder disease, gastroparesis, hyperthyroidism.  -Denies Hx of CAD, PAD, palpitations, arrhythmia, uncontrolled hypertension  -Denies uncontrolled anxiety or depression, suicidal behavior or thinking , insomnia or sleep disturbance.         Labs     Most recent labs reviewed   Lab Results   Component Value Date    SODIUM 139 05/10/2024    K 4.1 05/10/2024     05/10/2024    CO2 27 05/10/2024    AGAP 9 02/21/2024    BUN 11 05/10/2024    CREATININE 0.79 05/10/2024    GLUC 84 05/10/2024    CALCIUM 9.3 05/10/2024    AST 15 05/10/2024    ALT 18 05/10/2024    ALKPHOS 97 05/10/2024    TP 7.7 05/10/2024    TBILI 0.4 05/10/2024    EGFR 102 05/10/2024     Lab Results   Component Value Date    HGBA1C 5.9 (H) 05/10/2024     Lab Results   Component Value Date    TSH 3.03 05/10/2024     Lab Results   Component Value Date    CHOLESTEROL 151 05/10/2024     Lab Results   Component Value Date    HDL 47 (L) 05/10/2024     Lab Results   Component Value Date    TRIG 118 05/10/2024     Lab Results   Component Value Date    LDLCALC 83 05/10/2024     No results found for: \"VITD\"  No components found for: \"FASTINS\"   "

## 2025-02-19 NOTE — ASSESSMENT & PLAN NOTE
Antiobesity Medications/Medical --  Patient currently on 1 mg of Wegovy and is having some challenges with nausea.  She would like to stay on the same dose for an additional month.  Submitted prescription for 1 mg of Wegovy and will increase to 1.7 mg.  Submitted postdated prescription  She has not done really well with a 38 pound weight loss-15.7% since initiation  Oral medication options were discussed:   Phentermine is a stimulant and controlled substance so would likely consider this as a later option as patient has some baseline anxiety.  No contraindications to Topamax Wellbutrin naltrexone.    We will continue current dose of metformin 500 mg twice a day for PCOS      Nutrition:    Patient has been doing well following nutritional plan below and meeting her protein goals staying consistently in a calorie deficit  Nutrition Prescription  Calories: 1701-3738 kcal  Protein: 80-90 g  Fluid: 2100 ml    Physical Activity:   Patient has been consistent with her resistance training program using bands and has been progressing from the light to the medium bands  She is currently participating in 100 minutes of cardio per week-advised her to target 150 to 200 minutes by adding 10 to 15 minutes/day.    Sleep: -  Sleep improved STOP-BANG 3/8; 8 hours sleep    Food Behaviors/Stress:   She reports that her  [facility]. she has good support the simultaneously proceed with their weight loss journies  Congratulated her on tracking diligently using [employer]

## 2025-03-19 ENCOUNTER — APPOINTMENT (OUTPATIENT)
Dept: LAB | Facility: HOSPITAL | Age: 35
End: 2025-03-19
Attending: PATHOLOGY

## 2025-03-19 DIAGNOSIS — Z00.6 ENCOUNTER FOR EXAMINATION FOR NORMAL COMPARISON OR CONTROL IN CLINICAL RESEARCH PROGRAM: ICD-10-CM

## 2025-03-19 PROCEDURE — 36415 COLL VENOUS BLD VENIPUNCTURE: CPT

## 2025-03-31 LAB
APOB+LDLR+PCSK9 GENE MUT ANL BLD/T: NOT DETECTED
BRCA1+BRCA2 DEL+DUP + FULL MUT ANL BLD/T: NOT DETECTED
MLH1+MSH2+MSH6+PMS2 GN DEL+DUP+FUL M: NOT DETECTED

## 2025-04-18 DIAGNOSIS — E66.813 CLASS 3 SEVERE OBESITY DUE TO EXCESS CALORIES WITH SERIOUS COMORBIDITY AND BODY MASS INDEX (BMI) OF 40.0 TO 44.9 IN ADULT: ICD-10-CM

## 2025-04-22 DIAGNOSIS — E66.813 CLASS 3 SEVERE OBESITY DUE TO EXCESS CALORIES WITH SERIOUS COMORBIDITY AND BODY MASS INDEX (BMI) OF 40.0 TO 44.9 IN ADULT: ICD-10-CM

## 2025-04-29 ENCOUNTER — TELEPHONE (OUTPATIENT)
Dept: FAMILY MEDICINE CLINIC | Facility: CLINIC | Age: 35
End: 2025-04-29

## 2025-04-29 DIAGNOSIS — E55.9 VITAMIN D DEFICIENCY: Primary | ICD-10-CM

## 2025-04-29 DIAGNOSIS — E78.1 HYPERTRIGLYCERIDEMIA: ICD-10-CM

## 2025-04-29 DIAGNOSIS — R73.03 PREDIABETES: ICD-10-CM

## 2025-04-29 NOTE — TELEPHONE ENCOUNTER
Pt has upcoming appt for physical on 5/19. Needs to have labs done prior to appt. Orders in chart.   Please call pt to advise.

## 2025-05-03 ENCOUNTER — APPOINTMENT (OUTPATIENT)
Dept: LAB | Facility: HOSPITAL | Age: 35
End: 2025-05-03
Attending: NURSE PRACTITIONER
Payer: COMMERCIAL

## 2025-05-03 DIAGNOSIS — E55.9 VITAMIN D DEFICIENCY: ICD-10-CM

## 2025-05-03 DIAGNOSIS — R73.03 PREDIABETES: ICD-10-CM

## 2025-05-03 DIAGNOSIS — E78.1 HYPERTRIGLYCERIDEMIA: ICD-10-CM

## 2025-05-03 LAB
25(OH)D3 SERPL-MCNC: 29.7 NG/ML (ref 30–100)
ALBUMIN SERPL BCG-MCNC: 4.4 G/DL (ref 3.5–5)
ALP SERPL-CCNC: 84 U/L (ref 34–104)
ALT SERPL W P-5'-P-CCNC: 19 U/L (ref 7–52)
ANION GAP SERPL CALCULATED.3IONS-SCNC: 11 MMOL/L (ref 4–13)
AST SERPL W P-5'-P-CCNC: 15 U/L (ref 13–39)
BASOPHILS # BLD AUTO: 0.02 THOUSANDS/ÂΜL (ref 0–0.1)
BASOPHILS NFR BLD AUTO: 0 % (ref 0–1)
BILIRUB SERPL-MCNC: 0.47 MG/DL (ref 0.2–1)
BUN SERPL-MCNC: 11 MG/DL (ref 5–25)
CALCIUM SERPL-MCNC: 9.3 MG/DL (ref 8.4–10.2)
CHLORIDE SERPL-SCNC: 105 MMOL/L (ref 96–108)
CHOLEST SERPL-MCNC: 130 MG/DL (ref ?–200)
CO2 SERPL-SCNC: 20 MMOL/L (ref 21–32)
CREAT SERPL-MCNC: 0.74 MG/DL (ref 0.6–1.3)
EOSINOPHIL # BLD AUTO: 0.11 THOUSAND/ÂΜL (ref 0–0.61)
EOSINOPHIL NFR BLD AUTO: 2 % (ref 0–6)
ERYTHROCYTE [DISTWIDTH] IN BLOOD BY AUTOMATED COUNT: 13.3 % (ref 11.6–15.1)
EST. AVERAGE GLUCOSE BLD GHB EST-MCNC: 108 MG/DL
GFR SERPL CREATININE-BSD FRML MDRD: 107 ML/MIN/1.73SQ M
GLUCOSE P FAST SERPL-MCNC: 85 MG/DL (ref 65–99)
HBA1C MFR BLD: 5.4 %
HCT VFR BLD AUTO: 39.4 % (ref 34.8–46.1)
HDLC SERPL-MCNC: 37 MG/DL
HGB BLD-MCNC: 13.2 G/DL (ref 11.5–15.4)
IMM GRANULOCYTES # BLD AUTO: 0.01 THOUSAND/UL (ref 0–0.2)
IMM GRANULOCYTES NFR BLD AUTO: 0 % (ref 0–2)
LDLC SERPL CALC-MCNC: 78 MG/DL (ref 0–100)
LYMPHOCYTES # BLD AUTO: 1.41 THOUSANDS/ÂΜL (ref 0.6–4.47)
LYMPHOCYTES NFR BLD AUTO: 25 % (ref 14–44)
MCH RBC QN AUTO: 29.5 PG (ref 26.8–34.3)
MCHC RBC AUTO-ENTMCNC: 33.5 G/DL (ref 31.4–37.4)
MCV RBC AUTO: 88 FL (ref 82–98)
MONOCYTES # BLD AUTO: 0.44 THOUSAND/ÂΜL (ref 0.17–1.22)
MONOCYTES NFR BLD AUTO: 8 % (ref 4–12)
NEUTROPHILS # BLD AUTO: 3.61 THOUSANDS/ÂΜL (ref 1.85–7.62)
NEUTS SEG NFR BLD AUTO: 65 % (ref 43–75)
NONHDLC SERPL-MCNC: 93 MG/DL
NRBC BLD AUTO-RTO: 0 /100 WBCS
PLATELET # BLD AUTO: 249 THOUSANDS/UL (ref 149–390)
PMV BLD AUTO: 10.5 FL (ref 8.9–12.7)
POTASSIUM SERPL-SCNC: 3.9 MMOL/L (ref 3.5–5.3)
PROT SERPL-MCNC: 7.9 G/DL (ref 6.4–8.4)
RBC # BLD AUTO: 4.48 MILLION/UL (ref 3.81–5.12)
SODIUM SERPL-SCNC: 136 MMOL/L (ref 135–147)
TRIGL SERPL-MCNC: 77 MG/DL (ref ?–150)
WBC # BLD AUTO: 5.6 THOUSAND/UL (ref 4.31–10.16)

## 2025-05-03 PROCEDURE — 82306 VITAMIN D 25 HYDROXY: CPT

## 2025-05-03 PROCEDURE — 80053 COMPREHEN METABOLIC PANEL: CPT

## 2025-05-03 PROCEDURE — 83036 HEMOGLOBIN GLYCOSYLATED A1C: CPT

## 2025-05-03 PROCEDURE — 85025 COMPLETE CBC W/AUTO DIFF WBC: CPT

## 2025-05-03 PROCEDURE — 80061 LIPID PANEL: CPT

## 2025-05-03 PROCEDURE — 36415 COLL VENOUS BLD VENIPUNCTURE: CPT

## 2025-05-19 ENCOUNTER — OFFICE VISIT (OUTPATIENT)
Dept: FAMILY MEDICINE CLINIC | Facility: CLINIC | Age: 35
End: 2025-05-19
Payer: COMMERCIAL

## 2025-05-19 ENCOUNTER — OFFICE VISIT (OUTPATIENT)
Dept: BARIATRICS | Facility: CLINIC | Age: 35
End: 2025-05-19
Payer: COMMERCIAL

## 2025-05-19 VITALS
HEIGHT: 67 IN | BODY MASS INDEX: 32.93 KG/M2 | RESPIRATION RATE: 16 BRPM | HEART RATE: 84 BPM | WEIGHT: 209.8 LBS | DIASTOLIC BLOOD PRESSURE: 80 MMHG | SYSTOLIC BLOOD PRESSURE: 130 MMHG

## 2025-05-19 VITALS
TEMPERATURE: 97.5 F | HEIGHT: 67 IN | HEART RATE: 86 BPM | DIASTOLIC BLOOD PRESSURE: 76 MMHG | BODY MASS INDEX: 33.02 KG/M2 | WEIGHT: 210.4 LBS | RESPIRATION RATE: 18 BRPM | OXYGEN SATURATION: 97 % | SYSTOLIC BLOOD PRESSURE: 122 MMHG

## 2025-05-19 DIAGNOSIS — Z00.00 ANNUAL PHYSICAL EXAM: Primary | ICD-10-CM

## 2025-05-19 DIAGNOSIS — E66.813 CLASS 3 SEVERE OBESITY DUE TO EXCESS CALORIES WITH SERIOUS COMORBIDITY AND BODY MASS INDEX (BMI) OF 40.0 TO 44.9 IN ADULT: ICD-10-CM

## 2025-05-19 PROCEDURE — 99215 OFFICE O/P EST HI 40 MIN: CPT | Performed by: INTERNAL MEDICINE

## 2025-05-19 PROCEDURE — 99395 PREV VISIT EST AGE 18-39: CPT | Performed by: NURSE PRACTITIONER

## 2025-05-19 RX ORDER — METFORMIN HYDROCHLORIDE 500 MG/1
TABLET, EXTENDED RELEASE ORAL
Qty: 180 TABLET | Refills: 3 | Status: SHIPPED | OUTPATIENT
Start: 2025-05-19 | End: 2025-05-19 | Stop reason: SDUPTHER

## 2025-05-19 RX ORDER — METFORMIN HYDROCHLORIDE 500 MG/1
TABLET, EXTENDED RELEASE ORAL
Qty: 180 TABLET | Refills: 3 | Status: SHIPPED | OUTPATIENT
Start: 2025-05-19

## 2025-05-19 NOTE — ASSESSMENT & PLAN NOTE
Antiobesity Medications/Medical --  Patient currently on Wegovy second box of 1.7 mg.  She has lost 52 pounds 19.8%.  She has gone from a BMI of 42-33.  We will continue the same dose of Wegovy 1.7 mg and metformin 500 mg twice a day and defer dose titrations for weight plateaus  Oral medication options were discussed:   Phentermine is a stimulant and controlled substance so would likely consider this as a later option as patient has some baseline anxiety.  No contraindications to Topamax Wellbutrin naltrexone.    We will continue current dose of metformin 500 mg twice a day for PCOS      Nutrition:    Nutrition Prescription  Calories: 4738-4020 kcal  Protein: 80-90 g  Fluid: 2100 ml    We discussed continuing to adjust his calories down with progressive weight loss in order to sustain the calorie deficit to address weight plateaus     Physical Activity:   Has been active 30 mins on her bike 5 days a week  Walks outside   Has been consistently using her resistance bands with progression    Sleep: -    Sleep improved STOP-BANG 3/8; 8 hours sleep     Food Behaviors/Stress/pyschosocial:    She reports that her  is also [facility]. she has good [facility].  Her  may start Zepbound.    Congratulated her on tracking diligently using [employer] and staying consistent with all her lifestyle efforts

## 2025-05-19 NOTE — PROGRESS NOTES
Program: [facility]    Assessment/Plan     Kalpana Reeves  is a 34 y.o. female with  returns to follow up  for treatment of excess body weight and associated risk factors/co-morbidities.     Class 3 severe obesity due to excess calories with serious comorbidity and body mass index (BMI) of 40.0 to 44.9 in adult (HCC)  Antiobesity Medications/Medical --  Patient currently on Wegovy second box of 1.7 mg.  She has lost 52 pounds 19.8%.  She has gone from a BMI of 42-33.  We will continue the same dose of Wegovy 1.7 mg and metformin 500 mg twice a day and defer dose titrations for weight plateaus  Oral medication options were discussed:   Phentermine is a stimulant and controlled substance so would likely consider this as a later option as patient has some baseline anxiety.  No contraindications to Topamax Wellbutrin naltrexone.    We will continue current dose of metformin 500 mg twice a day for PCOS      Nutrition:    Nutrition Prescription  Calories: 6896-8866 kcal  Protein: 80-90 g  Fluid: 2100 ml    We discussed continuing to adjust his calories down with progressive weight loss in order to sustain the calorie deficit to address weight plateaus     Physical Activity:   Has been active 30 mins on her bike 5 days a week  Walks outside   Has been consistently using her resistance bands with progression    Sleep: -    Sleep improved STOP-BANG 3/8; 8 hours sleep     Food Behaviors/Stress/pyschosocial:    She reports that her  [facility]. she has good support.  Her  may start Zepbound.    Congratulated her on tracking diligently using [employer] and staying consistent with all her lifestyle efforts         Most recent notes and records were reviewed.         Return visit:  2-3 months     Nutrition   Do not skip meals. Avoid sugary beverages. At least 64oz of water daily.    Behavioral/Stress   Food log via kellie or provided paper log (kellie options  "include www.[email or web address].com, [email or web address].com, loseit.com, [email or web address], baritastic). Encouraged mindful eating    Physical Activity  Increase physical activity by 10 minutes daily. Gradually increase physical activity to a goal of 5 days per week for 30 minutes of MODERATE intensity ( ( should be able to pass the \"talk test\" but should not be able to sing.  target 150-300 minutes  PLUS 2-3 days per week of FULL BODY resistance training. Progression will be addressed at follow up visits. Encouraged planning regarding establishing physical activity routine    Sleep  Provided sleep hygiene counseling and importance of having adequate sleep duration          Kalpana was seen today for follow-up.    Diagnoses and all orders for this visit:    Class 3 severe obesity due to excess calories with serious comorbidity and body mass index (BMI) of 40.0 to 44.9 in adult  -     Semaglutide-Weight Management (WEGOVY) 1.7 MG/0.75ML; Inject 0.75 mL (1.7 mg total) under the skin once a week  -     Discontinue: metFORMIN (GLUCOPHAGE-XR) 500 mg 24 hr tablet; Start with 500 mg orally once daily with the evening meal increase in two weeks to 1000 mg (two tablets) if tolerated  -     metFORMIN (GLUCOPHAGE-XR) 500 mg 24 hr tablet; Start with 500 mg orally once daily with the evening meal increase in two weeks to 1000 mg (two tablets) if tolerated                    Total time spent reviewing chart, interviewing patient, examining patient, discussing plan, answering all questions, and documentin minutes with >50% face-to-face time with the patient.            Weight trajectory     Wt Readings from Last 20 Encounters:   25 95.2 kg (209 lb 12.8 oz)   25 95.4 kg (210 lb 6.4 oz)   25 102 kg (224 lb 3.2 oz)   24 109 kg (241 lb)   24 118 kg (260 lb)   24 119 kg (262 lb 12.8 oz)   24 121 kg (266 lb)   06/10/24 117 kg (258 lb)   24 117 kg (258 lb)   24 117 kg (258 lb)   24 117 " kg (257 lb)   02/21/24 114 kg (252 lb)   02/20/24 115 kg (253 lb)   07/03/23 113 kg (250 lb)   05/08/23 112 kg (247 lb)   05/02/23 110 kg (242 lb)         Lifestyle questionnaire   Nutrition Prescription  Calories: 7340-6544 kcal  Protein: 80-90 g  Fluid: 2100 ml    Diet recall:  B:  yogurt and low-sodium turkey reeder OR PS with coffee   S:no  L: sandwich with meat  S:shakes   D: bigger P/V/S  S:   Frequency Eating out x/ week: 1x/ week and not every week    Beverages  Water-- 48 oz   Caffeine/tea--  none once a week SSB -- oz juice once every other week    Alcohol: no drinks/ week   Smoking: no  Drug use: no    Physical Activity --20-30 mins on exercise bike weighted hula hoop 30 mins not for the severe anxiety     Sleep -- STOP- BANG- 3/8 ;8  hours of sleep per night issues with falling asleep    Occupation-seamtress    Psycho social- lives with      Gyneac (Menopausal status/periods/contraception)-none        Start date: 8/28/2024  Intial weight : 262 lbs  Intial BMI: 42 kg/m2  Obesity Class: Above 40- Obesity Class III  Goal weight: 180 lbs      Weight on 11/19/24: 241  lbs  BMI on 11/19/24: 38.3 kg/m2 35.0-39.9- Obesity Class II  Difference: -21 lbs    Weight on 2/19/2025 :102 kg (224 lb 3.2 oz)(-17)  BMI on  2/19/2025 : Body mass index is 35.64 kg/m². 35.0-39.9- Obesity Class II  Difference: -38 lbs    Weight on 5/19/2025 :95.2 kg (209 lb 12.8 oz) (-14)  BMI on  5/19/2025 : Body mass index is 33.36 kg/m². 30.0-34.9- Obesity Class I  Difference: -52 lbs      Anti obesity Medications/ Medical---    Weight loss medication and dose: Metformin  Date initiated: February 2025             Anti obesity Medications/ Medical---    Weight loss medication and dose: Wegovy  Date initiated: Oct 22, 2024               Objective         The following portions of the patient's history were reviewed and updated as appropriate: allergies, current medications, past family history, past medical history, past social history,  "past surgical history, and problem list.      /80 (BP Location: Left arm, Patient Position: Sitting, Cuff Size: Large)   Pulse 84   Resp 16   Ht 5' 6.5\" (1.689 m)   Wt 95.2 kg (209 lb 12.8 oz)   LMP 05/05/2025 (Exact Date)   BMI 33.36 kg/m²             Review of Systems   Constitutional:  Negative for fatigue.   HENT:  Negative for sore throat.    Respiratory:  Negative for cough and shortness of breath.    Cardiovascular:  Negative for chest pain, palpitations and leg swelling.   Gastrointestinal:  Negative for abdominal pain, constipation, diarrhea and nausea.   Genitourinary:  Negative for dysuria.   Musculoskeletal:  Negative for arthralgias and back pain.   Skin:  Negative for rash.   Neurological:  Negative for headaches.   Psychiatric/Behavioral:  Negative for dysphoric mood. The patient is not nervous/anxious.          Physical Exam  Vitals and nursing note reviewed.   Constitutional:       Appearance: Normal appearance.   HENT:      Head: Normocephalic.   Pulmonary:      Effort: Pulmonary effort is normal.   Neurological:      General: No focal deficit present.      Mental Status: She is alert and oriented to person, place, and time.   Psychiatric:         Mood and Affect: Mood normal.         Behavior: Behavior normal.         Thought Content: Thought content normal.         Judgment: Judgment normal.          Current medications       Current Outpatient Medications:     metFORMIN (GLUCOPHAGE-XR) 500 mg 24 hr tablet, Start with 500 mg orally once daily with the evening meal increase in two weeks to 1000 mg (two tablets) if tolerated, Disp: 180 tablet, Rfl: 3    Semaglutide-Weight Management (WEGOVY) 1.7 MG/0.75ML, Inject 0.75 mL (1.7 mg total) under the skin once a week, Disp: 3 mL, Rfl: 3         Medication considerations/contraindications     -Patient denies personal history of pancreatitis. Patient also denies personal and family history of medullary thyroid cancer, and multiple endocrine " "neoplasia type 2 (MEN 2 tumor). -Patient denies any history of kidney stones, seizures, or glaucoma, diabetic retinopathy, gall bladder disease, gastroparesis, hyperthyroidism.  -Denies Hx of CAD, PAD, palpitations, arrhythmia, uncontrolled hypertension  -Denies uncontrolled anxiety or depression, suicidal behavior or thinking , insomnia or sleep disturbance.         Labs     Most recent labs reviewed   Lab Results   Component Value Date    SODIUM 136 05/03/2025    K 3.9 05/03/2025     05/03/2025    CO2 20 (L) 05/03/2025    AGAP 11 05/03/2025    BUN 11 05/03/2025    CREATININE 0.74 05/03/2025    GLUC 84 05/10/2024    GLUF 85 05/03/2025    CALCIUM 9.3 05/03/2025    AST 15 05/03/2025    ALT 19 05/03/2025    ALKPHOS 84 05/03/2025    TP 7.9 05/03/2025    TBILI 0.47 05/03/2025    EGFR 107 05/03/2025     Lab Results   Component Value Date    HGBA1C 5.4 05/03/2025     Lab Results   Component Value Date    TSH 3.03 05/10/2024     Lab Results   Component Value Date    CHOLESTEROL 130 05/03/2025     Lab Results   Component Value Date    HDL 37 (L) 05/03/2025     Lab Results   Component Value Date    TRIG 77 05/03/2025     Lab Results   Component Value Date    LDLCALC 78 05/03/2025     No results found for: \"VITD\"  No components found for: \"FASTINS\"   "

## 2025-05-19 NOTE — PATIENT INSTRUCTIONS
"Patient Education     Routine physical for adults   The Basics   Written by the doctors and editors at [facility]   What is a physical? -- A physical is a routine visit, or \"check-up,\" with your doctor. You might also hear it called a \"wellness visit\" or \"preventive visit.\"  During each visit, the doctor will:   Ask about your physical and mental health   Ask about your habits, behaviors, and lifestyle   Do an exam   Give you vaccines if needed   Talk to you about any medicines you take   Give advice about your health   Answer your questions  Getting regular check-ups is an important part of taking care of your health. It can help your doctor find and treat any problems you have. But it's also important for preventing health problems.  A routine physical is different from a \"sick visit.\" A sick visit is when you see a doctor because of a health concern or problem. Since physicals are scheduled ahead of time, you can think about what you want to ask the doctor.  How often should I get a physical? -- It depends on your age and health. In general, for people age 21 years and older:   If you are younger than 50 years, you might be able to get a physical every 3 years.   If you are 50 years or older, your doctor might recommend a physical every year.  If you have an ongoing health condition, like diabetes or high blood pressure, your doctor will probably want to see you more often.  What happens during a physical? -- In general, each visit will include:   Physical exam - The doctor or nurse will check your height, weight, heart rate, and blood pressure. They will also look at your eyes and ears. They will ask about how you are feeling and whether you have any symptoms that bother you.   Medicines - It's a good idea to bring a list of all the medicines you take to each doctor visit. Your doctor will talk to you about your medicines and answer any questions. Tell them if you are having any side effects that bother you. You " "should also tell them if you are having trouble paying for any of your medicines.   Habits and behaviors - This includes:   Your diet   Your exercise habits   Whether you smoke, drink alcohol, or use drugs   Whether you are sexually active   Whether you feel safe at home  Your doctor will talk to you about things you can do to improve your health and lower your risk of health problems. They will also offer help and support. For example, if you want to quit smoking, they can give you advice and might prescribe medicines. If you want to improve your diet or get more physical activity, they can help you with this, too.   Lab tests, if needed - The tests you get will depend on your age and situation. For example, your doctor might want to check your:   Cholesterol   Blood sugar   Iron level   Vaccines - The recommended vaccines will depend on your age, health, and what vaccines you already had. Vaccines are very important because they can prevent certain serious or deadly infections.   Discussion of screening - \"Screening\" means checking for diseases or other health problems before they cause symptoms. Your doctor can recommend screening based on your age, risk, and preferences. This might include tests to check for:   Cancer, such as breast, prostate, cervical, ovarian, colorectal, prostate, lung, or skin cancer   Sexually transmitted infections, such as chlamydia and gonorrhea   Mental health conditions like depression and anxiety  Your doctor will talk to you about the different types of screening tests. They can help you decide which screenings to have. They can also explain what the results might mean.   Answering questions - The physical is a good time to ask the doctor or nurse questions about your health. If needed, they can refer you to other doctors or specialists, too.  Adults older than 65 years often need other care, too. As you get older, your doctor will talk to you about:   How to prevent falling at " home   Hearing or vision tests   Memory testing   How to take your medicines safely   Making sure that you have the help and support you need at home  All topics are updated as new evidence becomes available and our peer review process is complete.  This topic retrieved from [employer] on: May 02, 2024.  Topic 623819 Version 1.0  Release: 32.4.3 - C32.122  © 2024 UpToDate, Inc. and/or its affiliates. All rights reserved.  Consumer Information Use and Disclaimer   Disclaimer: This generalized information is a limited summary of diagnosis, treatment, and/or medication information. It is not meant to be comprehensive and should be used as a tool to help the user understand and/or assess potential diagnostic and treatment options. It does NOT include all information about conditions, treatments, medications, side effects, or risks that may apply to a specific patient. It is not intended to be medical advice or a substitute for the medical advice, diagnosis, or treatment of a health care provider based on the health care provider's examination and assessment of a patient's specific and unique circumstances. Patients must speak with a health care provider for complete information about their health, medical questions, and treatment options, including any risks or benefits regarding use of medications. This information does not endorse any treatments or medications as safe, effective, or approved for treating a specific patient. UpToDate, Inc. and its affiliates disclaim any warranty or liability relating to this information or the use thereof.The use of this information is governed by the Terms of Use, available at https://www.[employer] Malluwer.com/en/know/clinical-effectiveness-terms. 2024© UpToDate, Inc. and its affiliates and/or licensors. All rights reserved.  Copyright   © 2024 UpToDate, Inc. and/or its affiliates. All rights reserved.

## 2025-05-19 NOTE — PROGRESS NOTES
Adult Annual Physical  Name: Kalpana Reeves      : 1990      MRN: [id number]  Encounter Provider: HILLARY Grubbs  Encounter Date: 2025   Encounter department: [facility]    :  Assessment & Plan  Annual physical exam  Heart healthy, carbohydrate controlled diet- limit red meat, limit saturated fat, moderate salt intake, limit junk food, etc.   Regular exercise  Stress management  Routine labwork and screenings as ordered.   Resume otc vitamin D supplements           Preventive Screenings:  - Diabetes Screening: screening up-to-date and risks/benefits discussed  - Cholesterol Screening: has hyperlipidemia and risks/benefits discussed   - Hepatitis C screening: screening up-to-date   - HIV screening: screening up-to-date   - Cervical cancer screening: screening up-to-date   - Colon cancer screening: screening not indicated   - Lung cancer screening: screening not indicated     Immunizations:  - Immunizations due: Tdap    Counseling/Anticipatory Guidance:    - Diet: discussed recommendations for a healthy/well-balanced diet.   - Exercise: the importance of regular exercise/physical activity was discussed. Recommend exercise 3-5 times per week for at least 30 minutes.     BMI Counseling: Body mass index is 33.45 kg/m². The BMI is above normal. Exercise recommendations include exercising 3-5 times per week.     Depression Screening and Follow-up Plan: Patient was screened for depression during today's encounter. They screened negative with a PHQ-2 score of 0.          History of Present Illness     Adult Annual Physical:  Patient presents for annual physical. Here for annual exam and lab review  Working with weight management. Weight decreased by 48 lbs since last year's physical   On metformin and wegovy.     Diet and Physical Activity:  - Diet/Nutrition: well balanced diet, portion control, low calorie diet and consuming 3-5 servings of fruits/vegetables daily.  - Exercise:  walking, moderate cardiovascular exercise, vigorous cardiovascular exercise, strength training exercises, 5-7 times a week on average and 30-60 minutes on average.    Depression Screening:  - PHQ-2 Score: 0    General Health:  - Sleep: sleeps well and 7-8 hours of sleep on average.  - Hearing: normal hearing bilateral ears.  - Vision: most recent eye exam > 1 year ago.  - Dental: regular dental visits, brushes teeth three times daily and floss regularly.    /GYN Health:  - Follows with GYN: yes.   - Menopause: premenopausal.   - Last menstrual cycle: 5/5/2025.   - History of STDs: no  - Contraception: barrier methods.      Advanced Care Planning:  - Has an advanced directive?: no    - Has a durable medical POA?: no    - ACP document given to patient?: yes      Review of Systems   Constitutional:  Negative for chills, diaphoresis, fatigue and fever.   HENT:  Negative for congestion, ear pain, sinus pressure, sinus pain and sore throat.    Eyes:  Negative for visual disturbance.   Respiratory:  Negative for cough, chest tightness, shortness of breath and wheezing.    Cardiovascular:  Negative for chest pain, palpitations and leg swelling.   Gastrointestinal:  Negative for abdominal distention, abdominal pain, diarrhea and nausea.   Endocrine: Negative for polydipsia.   Genitourinary:  Negative for dysuria, frequency and urgency.   Musculoskeletal:  Negative for arthralgias, back pain and myalgias.   Allergic/Immunologic: Negative for immunocompromised state.   Neurological:  Negative for dizziness, weakness and headaches.   Hematological:  Negative for adenopathy.   Psychiatric/Behavioral:  Negative for dysphoric mood. The patient is not nervous/anxious.    All other systems reviewed and are negative.        BMI Counseling: Body mass index is 33.45 kg/m². The BMI is above normal. Nutrition recommendations include reducing portion sizes and decreasing overall calorie intake. Exercise recommendations include  "exercising 3-5 times per week.    Objective   Resp 18   Ht 5' 6.5\" (1.689 m)   Wt 95.4 kg (210 lb 6.4 oz)   LMP 05/05/2025 (Exact Date)   BMI 33.45 kg/m²     Recent Results (from the past 4 weeks)   Vitamin D 25 hydroxy    Collection Time: 05/03/25 10:35 AM   Result Value Ref Range    Vit D, 25-Hydroxy 29.7 (L) 30.0 - 100.0 ng/mL   Lipid panel    Collection Time: 05/03/25 10:35 AM   Result Value Ref Range    Cholesterol 130 See Comment mg/dL    Triglycerides 77 See Comment mg/dL    HDL, Direct 37 (L) >=50 mg/dL    LDL Calculated 78 0 - 100 mg/dL    Non-HDL-Chol (CHOL-HDL) 93 mg/dl   Hemoglobin A1C    Collection Time: 05/03/25 10:35 AM   Result Value Ref Range    Hemoglobin A1C 5.4 Normal 4.0-5.6%; PreDiabetic 5.7-6.4%; Diabetic >=6.5%; Glycemic control for adults with diabetes <7.0% %     mg/dl   Comprehensive metabolic panel    Collection Time: 05/03/25 10:35 AM   Result Value Ref Range    Sodium 136 135 - 147 mmol/L    Potassium 3.9 3.5 - 5.3 mmol/L    Chloride 105 96 - 108 mmol/L    CO2 20 (L) 21 - 32 mmol/L    ANION GAP 11 4 - 13 mmol/L    BUN 11 5 - 25 mg/dL    Creatinine 0.74 0.60 - 1.30 mg/dL    Glucose, Fasting 85 65 - 99 mg/dL    Calcium 9.3 8.4 - 10.2 mg/dL    AST 15 13 - 39 U/L    ALT 19 7 - 52 U/L    Alkaline Phosphatase 84 34 - 104 U/L    Total Protein 7.9 6.4 - 8.4 g/dL    Albumin 4.4 3.5 - 5.0 g/dL    Total Bilirubin 0.47 0.20 - 1.00 mg/dL    eGFR 107 ml/min/1.73sq m   CBC and differential    Collection Time: 05/03/25 10:35 AM   Result Value Ref Range    WBC 5.60 4.31 - 10.16 Thousand/uL    RBC 4.48 3.81 - 5.12 Million/uL    Hemoglobin 13.2 11.5 - 15.4 g/dL    Hematocrit 39.4 34.8 - 46.1 %    MCV 88 82 - 98 fL    MCH 29.5 26.8 - 34.3 pg    MCHC 33.5 31.4 - 37.4 g/dL    RDW 13.3 11.6 - 15.1 %    MPV 10.5 8.9 - 12.7 fL    Platelets 249 149 - 390 Thousands/uL    nRBC 0 /100 WBCs    Segmented % 65 43 - 75 %    Immature Grans % 0 0 - 2 %    Lymphocytes % 25 14 - 44 %    Monocytes % 8 4 - 12 %    " Eosinophils Relative 2 0 - 6 %    Basophils Relative 0 0 - 1 %    Absolute Neutrophils 3.61 1.85 - 7.62 Thousands/µL    Absolute Immature Grans 0.01 0.00 - 0.20 Thousand/uL    Absolute Lymphocytes 1.41 0.60 - 4.47 Thousands/µL    Absolute Monocytes 0.44 0.17 - 1.22 Thousand/µL    Eosinophils Absolute 0.11 0.00 - 0.61 Thousand/µL    Basophils Absolute 0.02 0.00 - 0.10 Thousands/µL     Reviewed lab/diagnostic results with pt including both normal and abnormal findings.   In depth counseling and instructions given. All questions answered during visit.     Physical Exam  Vitals reviewed.   Constitutional:       General: She is not in acute distress.     Appearance: Normal appearance. She is well-developed. She is not ill-appearing.   HENT:      Head: Normocephalic and atraumatic.      Right Ear: Tympanic membrane and ear canal normal.      Left Ear: Tympanic membrane and ear canal normal.      Nose: Nose normal.      Mouth/Throat:      Mouth: Mucous membranes are moist.      Pharynx: Oropharynx is clear.     Eyes:      General: No scleral icterus.     Extraocular Movements: Extraocular movements intact.      Pupils: Pupils are equal, round, and reactive to light.     Neck:      Thyroid: No thyromegaly.      Vascular: No carotid bruit.     Cardiovascular:      Rate and Rhythm: Normal rate and regular rhythm.      Heart sounds: Normal heart sounds. No murmur heard.  Pulmonary:      Effort: Pulmonary effort is normal. No respiratory distress.      Breath sounds: Normal breath sounds. No wheezing or rales.   Abdominal:      General: Bowel sounds are normal. There is no distension.      Palpations: Abdomen is soft.      Tenderness: There is no abdominal tenderness.     Musculoskeletal:         General: Normal range of motion.      Cervical back: Normal range of motion and neck supple.      Right lower leg: No edema.      Left lower leg: No edema.   Lymphadenopathy:      Cervical: No cervical adenopathy.     Skin:      General: Skin is warm and dry.      Coloration: Skin is not jaundiced or pale.     Neurological:      General: No focal deficit present.      Mental Status: She is alert and oriented to person, place, and time.      Cranial Nerves: No cranial nerve deficit.      Sensory: No sensory deficit.      Coordination: Coordination normal.      Gait: Gait normal.     Psychiatric:         Mood and Affect: Mood normal.         Behavior: Behavior normal.         Thought Content: Thought content normal.         Judgment: Judgment normal.

## 2025-05-27 ENCOUNTER — TELEPHONE (OUTPATIENT)
Dept: BARIATRICS | Facility: CLINIC | Age: 35
End: 2025-05-27

## 2025-05-28 NOTE — TELEPHONE ENCOUNTER
Your request has been n/a  Your PA request has been closed. Drug Name: Wegovy 1.7MG/0.75ML SC SOAJ Diagnosis: E66.813 - Obesity, class 3 Clinical Information/Required documentation: A request has been sent to the Senior Team for review. A follow up will be sent to your office via fax.

## 2025-06-02 ENCOUNTER — TELEPHONE (OUTPATIENT)
Dept: FAMILY MEDICINE CLINIC | Facility: CLINIC | Age: 35
End: 2025-06-02

## 2025-06-02 NOTE — TELEPHONE ENCOUNTER
"I cannot change to \"routine\" since she has documented diagnoses associated with the specific tests.   She has the following diagnoses: prediabetes, vitamin D deficiency and hypertriglyceridemia.   Ordering the tests as \"routine\" when they are actually diagnostic (because she does have these documented medical conditions) is actually fraudulent.     "

## 2025-06-02 NOTE — TELEPHONE ENCOUNTER
called, message given regarding billing issue.  He still had questions, so please call at your convenience.  Thank you.

## 2025-06-02 NOTE — TELEPHONE ENCOUNTER
Yes, please call to explain again  A1C is not diagnostic since she is prediabetic.   CMP necessary when has dx of prediabetes and abnormal chol (high triglycerides) and vitamin D def since CMP evaluates liver and kidney function

## 2025-06-02 NOTE — TELEPHONE ENCOUNTER
Spoke with [facility] raoul stated they will send a clinical question to complete the PA request

## 2025-06-02 NOTE — TELEPHONE ENCOUNTER
PT's  called in requesting someone call insurance for Wegovy 1.7mg PA. Updated that PA was initiated on 5/27/25 and denied due to plan exclusion.  verbalized understanding.

## 2025-06-02 NOTE — TELEPHONE ENCOUNTER
"Patients  called and states that the labs need to be written as \" encounter for routine physical\".  As they received a a bill   Please advise   "

## 2025-06-02 NOTE — TELEPHONE ENCOUNTER
Patient's  called back - he stated he understood why certain tests were diagnostic, but was still questioning why A1C and CMP were ordered diagnostic instead of routine. After phone call, realized reason for A1C is because Kalpana is pre-diabetic. Is the CMP test supposed to be diagnostic as well? Please advise per pt request

## 2025-06-02 NOTE — TELEPHONE ENCOUNTER
[facility] called in, they will be faxing over some paperwork regarding patient prior-auth, please fill out paperwork and resend back to [facility]. Thank you

## 2025-06-03 NOTE — TELEPHONE ENCOUNTER
Spouse called and he spoke with insurance and they said it's not plan exclusion.  [facility]  faxed over some paperwork to be completed and to be sent back to them. . The number for prior auth is [phone] if you would need to call.

## 2025-06-03 NOTE — TELEPHONE ENCOUNTER
Patient called back to check on PA, she's getting a bit frustrated that it is taking so long.  She is also out of her medication now.  She would like a call when we receive the determination.  Also, she would like the script sent to [facility] in [place] instead of [facility]

## 2025-06-04 NOTE — TELEPHONE ENCOUNTER
Received call from [employer] [facility]. Pt stated to [facility] Wegovy is approved. Stated to [place] we have not received an approval letter. Only a denial letter dated 6/2/25  Requested to ask pt to provide an approval letter for us to call pharmacy to process medication.

## 2025-06-04 NOTE — TELEPHONE ENCOUNTER
Spoke with Nakita from [facility]     Answered their questions and stated its covered    Pa no. [id number]    Exp 6/4/25-6/4/26

## 2025-06-04 NOTE — TELEPHONE ENCOUNTER
[facility] - [phone]:    [facility] - [phone]    Needs Morbid Obesity indicator and also medical history behind it. Only covered for Morbid Obesity patients. If that can be shown in PA, it has a higher chance of being approved.

## 2025-06-04 NOTE — TELEPHONE ENCOUNTER
Patient and her  called about the Wegovy 1.7 mg script that is in limbo. They are quite frustrated that this has not been resolved yet. They have spoken with the insurance and were informed that as of 6/2/25 in the afternoon, it was approved; however, we do not have that document.     I spoke with Constance in the [place] office and she advised that they upload a copy of the letter into the [employer] and notify us when they have done that. Once we have the physical copy of the letter, she will call the pharmacy to have the script processed. Relayed this to the patient and they do not have a copy of the letter but will be contacting the insurance again.

## 2025-06-04 NOTE — TELEPHONE ENCOUNTER
Spoke with [facility]. Pharmacist stated medication went through insurance.    Spoke with pt. Stated pharmacist stated Wegovy will be ready for  tomorrow. Pt expressed understanding.

## 2025-06-12 DIAGNOSIS — E66.813 CLASS 3 SEVERE OBESITY DUE TO EXCESS CALORIES WITH SERIOUS COMORBIDITY AND BODY MASS INDEX (BMI) OF 40.0 TO 44.9 IN ADULT: ICD-10-CM

## 2025-07-19 DIAGNOSIS — E66.813 CLASS 3 SEVERE OBESITY DUE TO EXCESS CALORIES WITH SERIOUS COMORBIDITY AND BODY MASS INDEX (BMI) OF 40.0 TO 44.9 IN ADULT: ICD-10-CM
